# Patient Record
Sex: MALE | Race: WHITE | NOT HISPANIC OR LATINO | Employment: OTHER | URBAN - NONMETROPOLITAN AREA
[De-identification: names, ages, dates, MRNs, and addresses within clinical notes are randomized per-mention and may not be internally consistent; named-entity substitution may affect disease eponyms.]

---

## 2022-02-18 ENCOUNTER — HOSPITAL ENCOUNTER (OUTPATIENT)
Dept: ULTRASOUND IMAGING | Facility: HOSPITAL | Age: 67
Discharge: HOME | End: 2022-02-18
Admitting: NURSE PRACTITIONER

## 2022-02-18 PROCEDURE — 76857 US EXAM PELVIC LIMITED: CPT

## 2022-12-27 ENCOUNTER — OFFICE VISIT (OUTPATIENT)
Dept: INTERNAL MEDICINE | Facility: CLINIC | Age: 67
End: 2022-12-27

## 2022-12-27 VITALS
TEMPERATURE: 97.1 F | OXYGEN SATURATION: 98 % | HEART RATE: 87 BPM | HEIGHT: 64 IN | WEIGHT: 150 LBS | SYSTOLIC BLOOD PRESSURE: 120 MMHG | DIASTOLIC BLOOD PRESSURE: 74 MMHG | BODY MASS INDEX: 25.61 KG/M2

## 2022-12-27 DIAGNOSIS — G89.29 CHRONIC PAIN OF RIGHT KNEE: ICD-10-CM

## 2022-12-27 DIAGNOSIS — R93.3 ABNORMAL FINDINGS ON DIAGNOSTIC IMAGING OF OTHER PARTS OF DIGESTIVE TRACT: ICD-10-CM

## 2022-12-27 DIAGNOSIS — M54.16 RIGHT LUMBAR RADICULOPATHY: ICD-10-CM

## 2022-12-27 DIAGNOSIS — Z72.0 TOBACCO ABUSE: ICD-10-CM

## 2022-12-27 DIAGNOSIS — R53.83 FATIGUE, UNSPECIFIED TYPE: ICD-10-CM

## 2022-12-27 DIAGNOSIS — M25.561 CHRONIC PAIN OF RIGHT KNEE: ICD-10-CM

## 2022-12-27 DIAGNOSIS — Z12.5 SCREENING PSA (PROSTATE SPECIFIC ANTIGEN): ICD-10-CM

## 2022-12-27 DIAGNOSIS — L65.9 ALOPECIA: Primary | ICD-10-CM

## 2022-12-27 DIAGNOSIS — E55.9 VITAMIN D DEFICIENCY, UNSPECIFIED: ICD-10-CM

## 2022-12-27 DIAGNOSIS — R35.1 NOCTURIA: ICD-10-CM

## 2022-12-27 DIAGNOSIS — R25.2 LEG CRAMP: ICD-10-CM

## 2022-12-27 PROBLEM — J43.1 PANLOBULAR EMPHYSEMA: Status: ACTIVE | Noted: 2022-12-27

## 2022-12-27 PROCEDURE — 99204 OFFICE O/P NEW MOD 45 MIN: CPT | Performed by: INTERNAL MEDICINE

## 2022-12-27 RX ORDER — PRAMIPEXOLE DIHYDROCHLORIDE 0.25 MG/1
0.25 TABLET ORAL NIGHTLY
Qty: 30 TABLET | Refills: 2 | Status: SHIPPED | OUTPATIENT
Start: 2022-12-27 | End: 2023-02-07 | Stop reason: SDUPTHER

## 2022-12-27 RX ORDER — ALBUTEROL SULFATE 90 UG/1
AEROSOL, METERED RESPIRATORY (INHALATION) SEE ADMIN INSTRUCTIONS
COMMUNITY
Start: 2022-12-24 | End: 2023-02-07 | Stop reason: SDUPTHER

## 2022-12-27 RX ORDER — FLUTICASONE FUROATE AND VILANTEROL TRIFENATATE 100; 25 UG/1; UG/1
1 POWDER RESPIRATORY (INHALATION) DAILY
COMMUNITY
Start: 2022-12-24 | End: 2023-02-07 | Stop reason: SDUPTHER

## 2022-12-27 NOTE — PROGRESS NOTES
Subjective   Peter Ghotra is a 67 y.o. male.     Chief Complaint   Patient presents with   • Establish Care   • Hair/Scalp Problem     Month; right eyebrow (half); right mustache (half); patches on scalp        History of Present Illness   Patient here to establish care.  Patient complains 6 weeks hair loss patchy in the scalp mustache eyebrow area no itchy no rash denies any stress patient wonders about thyroid function.  Patient does have a fatigue comes and goes also complains of leg cramp at nighttime.  Patient does have a chronic pain in the right knee and the lower back.  Patient does get up at night to urinate.    Current Outpatient Medications:   •  Breo Ellipta 100-25 MCG/ACT aerosol powder , Inhale 1 puff Daily., Disp: , Rfl:   •  Ventolin  (90 Base) MCG/ACT inhaler, Inhale See Admin Instructions. Inhale 2 puffs by mouth every 4 to 6 hours as needed, Disp: , Rfl:   •  pramipexole (Mirapex) 0.25 MG tablet, Take 1 tablet by mouth Every Night., Disp: 30 tablet, Rfl: 2    The following portions of the patient's history were reviewed and updated as appropriate: allergies, current medications, past family history, past medical history, past social history, past surgical history and problem list.    Review of Systems   Constitutional: Positive for fatigue.   Respiratory: Positive for wheezing. Negative for shortness of breath.    Cardiovascular: Negative.  Negative for leg swelling.   Gastrointestinal: Negative.    Musculoskeletal: Positive for arthralgias and back pain.   Skin: Negative.    Neurological: Negative.    Psychiatric/Behavioral: Negative.  Negative for agitation and suicidal ideas. The patient is not nervous/anxious.        Objective   Physical Exam  Constitutional:       Appearance: Normal appearance. He is normal weight.   Cardiovascular:      Rate and Rhythm: Normal rate and regular rhythm.      Heart sounds: Normal heart sounds.   Pulmonary:      Effort: Pulmonary effort is normal.       Breath sounds: Wheezing present.   Abdominal:      General: Bowel sounds are normal.   Musculoskeletal:      Cervical back: Neck supple.   Skin:     General: Skin is warm.   Neurological:      Mental Status: He is alert and oriented to person, place, and time.   Psychiatric:         Behavior: Behavior normal.         All tests have been reviewed.    Assessment & Plan   Diagnoses and all orders for this visit:    Alopecia check lab  -     TSH  -     T4, Free    Tobacco abuse CT scan next encourage patient to quit    Fatigue, unspecified type  -     CBC & Differential  -     Comprehensive Metabolic Panel  -     Lipid Panel  -     Vitamin B12  -     C-reactive Protein  -     Sedimentation Rate  -     Hepatitis C Antibody  -     Folate  -     Vitamin D,25-Hydroxy    Leg cramp initiate medication  -     pramipexole (Mirapex) 0.25 MG tablet; Take 1 tablet by mouth Every Night.    Chronic pain of right knee to x-ray    Right lumbar radiculopathy  -     XR Spine Lumbar 2 or 3 View    Screening PSA (prostate specific antigen)  -     PSA DIAGNOSTIC    Vitamin D deficiency, unspecified  -     Vitamin D,25-Hydroxy    Abnormal findings on diagnostic imaging of other parts of digestive tract  -     Lipid Panel    Nocturia  -     PSA DIAGNOSTIC    Other orders  -     Ventolin  (90 Base) MCG/ACT inhaler; Inhale See Admin Instructions. Inhale 2 puffs by mouth every 4 to 6 hours as needed  -     Breo Ellipta 100-25 MCG/ACT aerosol powder ; Inhale 1 puff Daily.    4 week wellness

## 2023-02-07 ENCOUNTER — OFFICE VISIT (OUTPATIENT)
Dept: INTERNAL MEDICINE | Facility: CLINIC | Age: 68
End: 2023-02-07
Payer: MEDICARE

## 2023-02-07 VITALS
HEIGHT: 64 IN | OXYGEN SATURATION: 98 % | SYSTOLIC BLOOD PRESSURE: 124 MMHG | TEMPERATURE: 98.1 F | BODY MASS INDEX: 25.61 KG/M2 | WEIGHT: 150 LBS | HEART RATE: 70 BPM | DIASTOLIC BLOOD PRESSURE: 80 MMHG

## 2023-02-07 DIAGNOSIS — M54.16 RIGHT LUMBAR RADICULOPATHY: Primary | ICD-10-CM

## 2023-02-07 DIAGNOSIS — J43.1 PANLOBULAR EMPHYSEMA: ICD-10-CM

## 2023-02-07 DIAGNOSIS — Z12.5 SCREENING PSA (PROSTATE SPECIFIC ANTIGEN): ICD-10-CM

## 2023-02-07 DIAGNOSIS — F17.210 NICOTINE DEPENDENCE, CIGARETTES, UNCOMPLICATED: ICD-10-CM

## 2023-02-07 DIAGNOSIS — G89.29 CHRONIC PAIN OF RIGHT KNEE: ICD-10-CM

## 2023-02-07 DIAGNOSIS — R35.1 NOCTURIA: ICD-10-CM

## 2023-02-07 DIAGNOSIS — Z72.0 TOBACCO ABUSE: ICD-10-CM

## 2023-02-07 DIAGNOSIS — Z12.11 COLON CANCER SCREENING: ICD-10-CM

## 2023-02-07 DIAGNOSIS — E55.9 VITAMIN D DEFICIENCY, UNSPECIFIED: ICD-10-CM

## 2023-02-07 DIAGNOSIS — M25.561 CHRONIC PAIN OF RIGHT KNEE: ICD-10-CM

## 2023-02-07 DIAGNOSIS — R25.2 LEG CRAMP: ICD-10-CM

## 2023-02-07 PROBLEM — R53.83 FATIGUE: Status: RESOLVED | Noted: 2022-12-27 | Resolved: 2023-02-07

## 2023-02-07 PROBLEM — R93.3 ABNORMAL FINDINGS ON DIAGNOSTIC IMAGING OF OTHER PARTS OF DIGESTIVE TRACT: Status: RESOLVED | Noted: 2022-12-27 | Resolved: 2023-02-07

## 2023-02-07 PROBLEM — L65.9 ALOPECIA: Status: RESOLVED | Noted: 2022-12-27 | Resolved: 2023-02-07

## 2023-02-07 PROCEDURE — G0439 PPPS, SUBSEQ VISIT: HCPCS | Performed by: INTERNAL MEDICINE

## 2023-02-07 PROCEDURE — 99213 OFFICE O/P EST LOW 20 MIN: CPT | Performed by: INTERNAL MEDICINE

## 2023-02-07 PROCEDURE — 1170F FXNL STATUS ASSESSED: CPT | Performed by: INTERNAL MEDICINE

## 2023-02-07 PROCEDURE — 1125F AMNT PAIN NOTED PAIN PRSNT: CPT | Performed by: INTERNAL MEDICINE

## 2023-02-07 PROCEDURE — 99397 PER PM REEVAL EST PAT 65+ YR: CPT | Performed by: INTERNAL MEDICINE

## 2023-02-07 PROCEDURE — 1159F MED LIST DOCD IN RCRD: CPT | Performed by: INTERNAL MEDICINE

## 2023-02-07 RX ORDER — FLUTICASONE FUROATE AND VILANTEROL TRIFENATATE 100; 25 UG/1; UG/1
1 POWDER RESPIRATORY (INHALATION) DAILY
Qty: 28 EACH | Refills: 3 | Status: SHIPPED | OUTPATIENT
Start: 2023-02-07

## 2023-02-07 RX ORDER — NAPROXEN 500 MG/1
500 TABLET ORAL 2 TIMES DAILY WITH MEALS
Qty: 60 TABLET | Refills: 1 | Status: SHIPPED | OUTPATIENT
Start: 2023-02-07 | End: 2023-03-01

## 2023-02-07 RX ORDER — ALBUTEROL SULFATE 90 UG/1
1 POWDER, METERED RESPIRATORY (INHALATION) EVERY 6 HOURS PRN
Qty: 1 EACH | Refills: 3 | Status: SHIPPED | OUTPATIENT
Start: 2023-02-07

## 2023-02-07 RX ORDER — PRAMIPEXOLE DIHYDROCHLORIDE 0.25 MG/1
0.25 TABLET ORAL NIGHTLY
Qty: 90 TABLET | Refills: 3 | Status: SHIPPED | OUTPATIENT
Start: 2023-02-07

## 2023-02-07 NOTE — PROGRESS NOTES
Jarad Ghotra is a 67 y.o. male and is here for a comprehensive physical exam.     Do you take any herbs or supplements that were not prescribed by a doctor? no  Are you taking calcium supplements? no  Are you taking aspirin daily? no      The following portions of the patient's history were reviewed and updated as appropriate: allergies, current medications, past family history, past medical history, past social history, past surgical history and problem list.    Patient Active Problem List   Diagnosis   • Tobacco abuse   • Leg cramp   • Chronic pain of right knee   • Right lumbar radiculopathy   • Panlobular emphysema (HCC)   • Nocturia   • Vitamin D deficiency, unspecified   • Screening PSA (prostate specific antigen)       Review of Systems   Constitutional: Negative.    HENT: Negative.    Eyes: Negative.    Respiratory: Negative.    Cardiovascular: Negative.    Gastrointestinal: Negative.    Endocrine: Negative.    Genitourinary: Negative.    Musculoskeletal: Positive for back pain.   Skin: Negative.    Allergic/Immunologic: Negative.    Neurological: Positive for numbness.   Hematological: Negative.    Psychiatric/Behavioral: Negative.    All other systems reviewed and are negative.      Physical Exam  Vitals and nursing note reviewed.   Constitutional:       Appearance: Normal appearance. He is well-developed and normal weight.   HENT:      Head: Normocephalic and atraumatic.      Right Ear: Tympanic membrane, ear canal and external ear normal.      Left Ear: Tympanic membrane, ear canal and external ear normal.      Nose: Nose normal.      Mouth/Throat:      Mouth: Mucous membranes are moist.      Pharynx: Oropharynx is clear.   Eyes:      Conjunctiva/sclera: Conjunctivae normal.      Pupils: Pupils are equal, round, and reactive to light.   Neck:      Thyroid: No thyromegaly.   Cardiovascular:      Rate and Rhythm: Normal rate and regular rhythm.      Heart sounds: Normal heart sounds.    Pulmonary:      Effort: Pulmonary effort is normal.      Breath sounds: Normal breath sounds.   Abdominal:      General: Bowel sounds are normal.      Palpations: Abdomen is soft.   Genitourinary:     Penis: Normal.       Prostate: Normal.      Rectum: Normal.   Musculoskeletal:         General: Normal range of motion.      Cervical back: Normal range of motion and neck supple.   Skin:     General: Skin is warm and dry.   Neurological:      Mental Status: He is alert and oriented to person, place, and time.      Deep Tendon Reflexes: Reflexes are normal and symmetric.   Psychiatric:         Behavior: Behavior normal.         Thought Content: Thought content normal.         Judgment: Judgment normal.         All  tests have been reviewed.    Assessment & Plan          1. Patient Counseling:  --Nutrition: Stressed importance of moderation in sodium/caffeine intake, saturated fat and cholesterol, caloric balance, sufficient intake of fresh fruits, vegetables, fiber, calcium and iron.  --Exercise: Stressed the importance of regular exercise.   --Injury prevention: Discussed safety belts, safety helmets, smoke detector, smoking near bedding or upholstery.   --Dental health: Discussed importance of regular tooth brushing, flossing, and dental visits.  --Immunizations reviewed.  --Discussed benefits of screening colonoscopy.  --After hours service discussed with patient    2. Discussed the patient's BMI with him.

## 2023-02-07 NOTE — PROGRESS NOTES
The ABCs of the Annual Wellness Visit  Subsequent Medicare Wellness Visit    Jarad Ghotra is a 67 y.o. male who presents for a Subsequent Medicare Wellness Visit.    The following portions of the patient's history were reviewed and   updated as appropriate: allergies, current medications, past family history, past medical history, past social history, past surgical history and problem list.    Compared to one year ago, the patient feels his physical   health is the same.    Compared to one year ago, the patient feels his mental   health is the same.    Recent Hospitalizations:  He was not admitted to the hospital during the last year.       Current Medical Providers:  Patient Care Team:  Sukhjinder Duncan MD as PCP - General (Internal Medicine)    Outpatient Medications Prior to Visit   Medication Sig Dispense Refill   • Breo Ellipta 100-25 MCG/ACT aerosol powder  Inhale 1 puff Daily.     • pramipexole (Mirapex) 0.25 MG tablet Take 1 tablet by mouth Every Night. 30 tablet 2   • Ventolin  (90 Base) MCG/ACT inhaler Inhale See Admin Instructions. Inhale 2 puffs by mouth every 4 to 6 hours as needed       No facility-administered medications prior to visit.       No opioid medication identified on active medication list. I have reviewed chart for other potential  high risk medication/s and harmful drug interactions in the elderly.          Aspirin is not on active medication list.  Aspirin use is not indicated based on review of current medical condition/s. Risk of harm outweighs potential benefits.  .    Patient Active Problem List   Diagnosis   • Tobacco abuse   • Leg cramp   • Chronic pain of right knee   • Right lumbar radiculopathy   • Panlobular emphysema (HCC)   • Nocturia   • Vitamin D deficiency, unspecified   • Screening PSA (prostate specific antigen)     Advance Care Planning  Advance Directive is not on file.  ACP discussion was held with the patient during this visit. Patient does not  "have an advance directive, declines further assistance.     Objective    Vitals:    02/07/23 1035   BP: 124/80   Pulse: 70   Temp: 98.1 °F (36.7 °C)   SpO2: 98%   Weight: 68 kg (150 lb)   Height: 162.6 cm (64.02\")   PainSc:   7   PainLoc: Back     Estimated body mass index is 25.73 kg/m² as calculated from the following:    Height as of this encounter: 162.6 cm (64.02\").    Weight as of this encounter: 68 kg (150 lb).    BMI is >= 25 and <30. (Overweight) The following options were offered after discussion;: exercise counseling/recommendations and nutrition counseling/recommendations      Does the patient have evidence of cognitive impairment?   No            HEALTH RISK ASSESSMENT    Smoking Status:  Social History     Tobacco Use   Smoking Status Every Day   • Packs/day: 0.50   • Types: Cigarettes   Smokeless Tobacco Never     Alcohol Consumption:  Social History     Substance and Sexual Activity   Alcohol Use Yes   • Alcohol/week: 2.0 standard drinks   • Types: 2 Cans of beer per week     Fall Risk Screen:    STEADI Fall Risk Assessment was completed, and patient is at LOW risk for falls.Assessment completed on:2/7/2023    Depression Screening:  PHQ-2/PHQ-9 Depression Screening 2/7/2023   Little Interest or Pleasure in Doing Things 0-->not at all   Feeling Down, Depressed or Hopeless 1-->several days   PHQ-9: Brief Depression Severity Measure Score 1       Health Habits and Functional and Cognitive Screening:  Functional & Cognitive Status 2/7/2023   Do you have difficulty preparing food and eating? No   Do you have difficulty bathing yourself, getting dressed or grooming yourself? No   Do you have difficulty using the toilet? No   Do you have difficulty moving around from place to place? Yes   Do you have trouble with steps or getting out of a bed or a chair? Yes   Current Diet Well Balanced Diet   Dental Exam Unknown        Dental Exam Comment Wears dentures   Eye Exam Up to date        Eye Exam Comment 2022 "   Exercise (times per week) 0 times per week   Current Exercises Include No Regular Exercise   Do you need help using the phone?  No   Are you deaf or do you have serious difficulty hearing?  No   Do you need help with transportation? No   Do you need help shopping? No   Do you need help preparing meals?  No   Do you need help with housework?  No   Do you need help with laundry? No   Do you need help taking your medications? No   Do you need help managing money? No   Do you ever drive or ride in a car without wearing a seat belt? No   Have you felt unusual stress, anger or loneliness in the last month? Yes   Who do you live with? Spouse   If you need help, do you have trouble finding someone available to you? No   Have you been bothered in the last four weeks by sexual problems? No   Do you have difficulty concentrating, remembering or making decisions? Yes       Age-appropriate Screening Schedule:  Refer to the list below for future screening recommendations based on patient's age, sex and/or medical conditions. Orders for these recommended tests are listed in the plan section. The patient has been provided with a written plan.    Health Maintenance   Topic Date Due   • DIABETIC EYE EXAM  12/27/2022   • DIABETIC FOOT EXAM  02/22/2023 (Originally 12/27/2022)   • URINE MICROALBUMIN  02/25/2023 (Originally 1955)   • HEMOGLOBIN A1C  03/01/2023 (Originally 12/27/2022)   • ZOSTER VACCINE (1 of 2) 03/05/2025 (Originally 5/14/2005)   • TDAP/TD VACCINES (2 - Td or Tdap) 11/23/2027   • INFLUENZA VACCINE  Completed                CMS Preventative Services Quick Reference  Risk Factors Identified During Encounter:    None Identified    The above risks/problems have been discussed with the patient.  Pertinent information has been shared with the patient in the After Visit Summary.    Diagnoses and all orders for this visit:    1. Right lumbar radiculopathy (Primary)  -     naproxen (Naprosyn) 500 MG tablet; Take 1 tablet  by mouth 2 (Two) Times a Day With Meals.  Dispense: 60 tablet; Refill: 1  -     Ambulatory Referral to Pain Management    2. Leg cramp  -     pramipexole (Mirapex) 0.25 MG tablet; Take 1 tablet by mouth Every Night.  Dispense: 90 tablet; Refill: 3    3. Vitamin D deficiency, unspecified    4. Screening PSA (prostate specific antigen)    5. Nocturia    6. Chronic pain of right knee    7. Panlobular emphysema (HCC)  -     Breo Ellipta 100-25 MCG/ACT aerosol powder ; Inhale 1 puff Daily.  Dispense: 28 each; Refill: 3  -     albuterol (ProAir RespiClick) 108 (90 Base) MCG/ACT inhaler; Inhale 1 puff Every 6 (Six) Hours As Needed for Shortness of Air.  Dispense: 1 each; Refill: 3    8. Tobacco abuse  -      CT Chest Low Dose Cancer Screening WO    9. Colon cancer screening  -     Ambulatory Referral to Gastroenterology    10. Nicotine dependence, cigarettes, uncomplicated  -      CT Chest Low Dose Cancer Screening WO        Follow Up:   Next Medicare Wellness visit to be scheduled in 1 year.      An After Visit Summary and PPPS were made available to the patient.      Patient has been erroneously marked as diabetic. Based on the available clinical information, he does not have diabetes and should therefore be excluded from diabetic health maintenance and quality measures for the remainder of the reporting period.

## 2023-02-07 NOTE — PROGRESS NOTES
Subjective   Peter Ghotra is a 67 y.o. male.     Chief Complaint   Patient presents with   • Medicare Wellness-subsequent       History of Present Illness   Patient here for Medicare wellness also has medical problems to be discussed.  Patient complains of lower back pain gradually getting worse history of radiculopathy pain radiated to right lower extremity all the way to below the knee numbness tingling certain position worse achy in nature.    Current Outpatient Medications:   •  Breo Ellipta 100-25 MCG/ACT aerosol powder , Inhale 1 puff Daily., Disp: 28 each, Rfl: 3  •  pramipexole (Mirapex) 0.25 MG tablet, Take 1 tablet by mouth Every Night., Disp: 90 tablet, Rfl: 3  •  albuterol (ProAir RespiClick) 108 (90 Base) MCG/ACT inhaler, Inhale 1 puff Every 6 (Six) Hours As Needed for Shortness of Air., Disp: 1 each, Rfl: 3  •  naproxen (Naprosyn) 500 MG tablet, Take 1 tablet by mouth 2 (Two) Times a Day With Meals., Disp: 60 tablet, Rfl: 1    The following portions of the patient's history were reviewed and updated as appropriate: allergies, current medications, past family history, past medical history, past social history, past surgical history and problem list.    Review of Systems   Musculoskeletal: Positive for back pain.   Neurological: Positive for numbness.       Objective   Physical Exam  Musculoskeletal:         General: Tenderness present.         All tests have been reviewed.    Assessment & Plan   Diagnoses and all orders for this visit:    Right lumbar radiculopathy  -     naproxen (Naprosyn) 500 MG tablet; Take 1 tablet by mouth 2 (Two) Times a Day With Meals.  -     Ambulatory Referral to Pain Management  Lumbar x-ray    -     albuterol (ProAir RespiClick) 108 (90 Base) MCG/ACT inhaler; Inhale 1 puff Every 6 (Six) Hours As Needed for Shortness of Air.

## 2023-02-08 ENCOUNTER — HOSPITAL ENCOUNTER (OUTPATIENT)
Dept: GENERAL RADIOLOGY | Facility: HOSPITAL | Age: 68
Discharge: HOME | End: 2023-02-08
Admitting: INTERNAL MEDICINE
Payer: MEDICARE

## 2023-02-08 DIAGNOSIS — M25.561 CHRONIC PAIN OF RIGHT KNEE: ICD-10-CM

## 2023-02-08 DIAGNOSIS — G89.29 CHRONIC PAIN OF RIGHT KNEE: ICD-10-CM

## 2023-02-08 PROCEDURE — 72100 X-RAY EXAM L-S SPINE 2/3 VWS: CPT

## 2023-02-08 PROCEDURE — 73562 X-RAY EXAM OF KNEE 3: CPT

## 2023-02-09 LAB
25(OH)D3+25(OH)D2 SERPL-MCNC: 13.6 NG/ML (ref 30–100)
ALBUMIN SERPL-MCNC: 4.4 G/DL (ref 3.5–5.2)
ALBUMIN/GLOB SERPL: 2.4 G/DL
ALP SERPL-CCNC: 64 U/L (ref 39–117)
ALT SERPL-CCNC: 16 U/L (ref 1–41)
AST SERPL-CCNC: 14 U/L (ref 1–40)
BASOPHILS # BLD AUTO: 0.07 10*3/MM3 (ref 0–0.2)
BASOPHILS NFR BLD AUTO: 0.9 % (ref 0–1.5)
BILIRUB SERPL-MCNC: 0.9 MG/DL (ref 0–1.2)
BUN SERPL-MCNC: 18 MG/DL (ref 8–23)
BUN/CREAT SERPL: 28.1 (ref 7–25)
CALCIUM SERPL-MCNC: 9.3 MG/DL (ref 8.6–10.5)
CHLORIDE SERPL-SCNC: 103 MMOL/L (ref 98–107)
CHOLEST SERPL-MCNC: 168 MG/DL (ref 0–200)
CO2 SERPL-SCNC: 28.7 MMOL/L (ref 22–29)
CREAT SERPL-MCNC: 0.64 MG/DL (ref 0.76–1.27)
CRP SERPL-MCNC: <0.3 MG/DL (ref 0–0.5)
EGFRCR SERPLBLD CKD-EPI 2021: 103.8 ML/MIN/1.73
EOSINOPHIL # BLD AUTO: 0.16 10*3/MM3 (ref 0–0.4)
EOSINOPHIL NFR BLD AUTO: 1.9 % (ref 0.3–6.2)
ERYTHROCYTE [DISTWIDTH] IN BLOOD BY AUTOMATED COUNT: 11.7 % (ref 12.3–15.4)
ERYTHROCYTE [SEDIMENTATION RATE] IN BLOOD BY WESTERGREN METHOD: <1 MM/HR (ref 0–20)
FOLATE SERPL-MCNC: 5.36 NG/ML (ref 4.78–24.2)
GLOBULIN SER CALC-MCNC: 1.8 GM/DL
GLUCOSE SERPL-MCNC: 142 MG/DL (ref 65–99)
HCT VFR BLD AUTO: 47.2 % (ref 37.5–51)
HCV AB S/CO SERPL IA: >11 S/CO RATIO (ref 0–0.9)
HDLC SERPL-MCNC: 57 MG/DL (ref 40–60)
HGB BLD-MCNC: 15.9 G/DL (ref 13–17.7)
IMM GRANULOCYTES # BLD AUTO: 0.02 10*3/MM3 (ref 0–0.05)
IMM GRANULOCYTES NFR BLD AUTO: 0.2 % (ref 0–0.5)
LDLC SERPL CALC-MCNC: 94 MG/DL (ref 0–100)
LYMPHOCYTES # BLD AUTO: 2.12 10*3/MM3 (ref 0.7–3.1)
LYMPHOCYTES NFR BLD AUTO: 25.8 % (ref 19.6–45.3)
MCH RBC QN AUTO: 32.6 PG (ref 26.6–33)
MCHC RBC AUTO-ENTMCNC: 33.7 G/DL (ref 31.5–35.7)
MCV RBC AUTO: 96.9 FL (ref 79–97)
MONOCYTES # BLD AUTO: 0.79 10*3/MM3 (ref 0.1–0.9)
MONOCYTES NFR BLD AUTO: 9.6 % (ref 5–12)
NEUTROPHILS # BLD AUTO: 5.05 10*3/MM3 (ref 1.7–7)
NEUTROPHILS NFR BLD AUTO: 61.6 % (ref 42.7–76)
NRBC BLD AUTO-RTO: 0 /100 WBC (ref 0–0.2)
PLATELET # BLD AUTO: 188 10*3/MM3 (ref 140–450)
POTASSIUM SERPL-SCNC: 4.9 MMOL/L (ref 3.5–5.2)
PROT SERPL-MCNC: 6.2 G/DL (ref 6–8.5)
PSA SERPL-MCNC: 1.05 NG/ML (ref 0–4)
RBC # BLD AUTO: 4.87 10*6/MM3 (ref 4.14–5.8)
SODIUM SERPL-SCNC: 140 MMOL/L (ref 136–145)
T4 FREE SERPL-MCNC: 1.33 NG/DL (ref 0.93–1.7)
TRIGL SERPL-MCNC: 90 MG/DL (ref 0–150)
TSH SERPL DL<=0.005 MIU/L-ACNC: 0.56 UIU/ML (ref 0.27–4.2)
VIT B12 SERPL-MCNC: 250 PG/ML (ref 211–946)
VLDLC SERPL CALC-MCNC: 17 MG/DL (ref 5–40)
WBC # BLD AUTO: 8.21 10*3/MM3 (ref 3.4–10.8)

## 2023-02-14 ENCOUNTER — HOSPITAL ENCOUNTER (OUTPATIENT)
Dept: CT IMAGING | Facility: HOSPITAL | Age: 68
Discharge: HOME | End: 2023-02-14
Admitting: INTERNAL MEDICINE
Payer: MEDICARE

## 2023-02-14 PROCEDURE — 71271 CT THORAX LUNG CANCER SCR C-: CPT

## 2023-03-01 ENCOUNTER — OFFICE VISIT (OUTPATIENT)
Dept: INTERNAL MEDICINE | Facility: CLINIC | Age: 68
End: 2023-03-01
Payer: MEDICARE

## 2023-03-01 VITALS
BODY MASS INDEX: 25.61 KG/M2 | OXYGEN SATURATION: 98 % | HEIGHT: 64 IN | DIASTOLIC BLOOD PRESSURE: 80 MMHG | SYSTOLIC BLOOD PRESSURE: 120 MMHG | HEART RATE: 66 BPM | WEIGHT: 150 LBS | TEMPERATURE: 97.8 F

## 2023-03-01 DIAGNOSIS — J43.1 PANLOBULAR EMPHYSEMA: ICD-10-CM

## 2023-03-01 DIAGNOSIS — Z72.0 TOBACCO ABUSE: ICD-10-CM

## 2023-03-01 DIAGNOSIS — R25.2 LEG CRAMP: ICD-10-CM

## 2023-03-01 DIAGNOSIS — M54.16 RIGHT LUMBAR RADICULOPATHY: Primary | ICD-10-CM

## 2023-03-01 DIAGNOSIS — B18.2 CHRONIC HEPATITIS C WITHOUT HEPATIC COMA: ICD-10-CM

## 2023-03-01 DIAGNOSIS — E55.9 VITAMIN D DEFICIENCY, UNSPECIFIED: ICD-10-CM

## 2023-03-01 DIAGNOSIS — R91.1 LUNG NODULE: ICD-10-CM

## 2023-03-01 DIAGNOSIS — R73.9 HYPERGLYCEMIA: ICD-10-CM

## 2023-03-01 PROCEDURE — 99214 OFFICE O/P EST MOD 30 MIN: CPT | Performed by: INTERNAL MEDICINE

## 2023-03-01 RX ORDER — ERGOCALCIFEROL 1.25 MG/1
50000 CAPSULE ORAL WEEKLY
Qty: 5 CAPSULE | Refills: 2 | Status: SHIPPED | OUTPATIENT
Start: 2023-03-01

## 2023-03-01 NOTE — PROGRESS NOTES
Subjective   Peter Ghotra is a 67 y.o. male.     Chief Complaint   Patient presents with   • Follow-up     Recent lab results    • Arthritis     Discuss medication- unable to tolerate Naproxen long term        History of Present Illness   Patient here for follow-up.  Patient still complains of lower back pain radiated to right leg over-the-counter medicine not helpful patient had a history of epidural helped.  Pain clinic patient here to see.  Blood test that showed patient has a very low vitamin D and also positive for hepatitis C.  Leg cramping improved after medication.  Low-dose CT scan showed the lung nodules.  Emphysema stable on medication.  Patient has already cut down  tobacco use.    Current Outpatient Medications:   •  albuterol (ProAir RespiClick) 108 (90 Base) MCG/ACT inhaler, Inhale 1 puff Every 6 (Six) Hours As Needed for Shortness of Air., Disp: 1 each, Rfl: 3  •  Breo Ellipta 100-25 MCG/ACT aerosol powder , Inhale 1 puff Daily., Disp: 28 each, Rfl: 3  •  pramipexole (Mirapex) 0.25 MG tablet, Take 1 tablet by mouth Every Night., Disp: 90 tablet, Rfl: 3  •  vitamin D (ERGOCALCIFEROL) 1.25 MG (43301 UT) capsule capsule, Take 1 capsule by mouth 1 (One) Time Per Week., Disp: 5 capsule, Rfl: 2    The following portions of the patient's history were reviewed and updated as appropriate: allergies, current medications, past family history, past medical history, past social history, past surgical history and problem list.    Review of Systems   Constitutional: Negative.    Respiratory: Negative.    Cardiovascular: Negative.    Gastrointestinal: Negative.    Musculoskeletal: Positive for back pain.   Skin: Negative.    Neurological: Negative.    Psychiatric/Behavioral: Negative.        Objective   Physical Exam  Cardiovascular:      Rate and Rhythm: Normal rate and regular rhythm.      Heart sounds: Normal heart sounds.   Pulmonary:      Effort: Pulmonary effort is normal.      Breath sounds: Normal  breath sounds.   Abdominal:      General: Bowel sounds are normal.   Musculoskeletal:         General: Tenderness present.      Cervical back: Neck supple.   Skin:     General: Skin is warm.   Neurological:      Mental Status: He is alert and oriented to person, place, and time.   Psychiatric:         Behavior: Behavior normal.         All tests have been reviewed.    Assessment & Plan   Diagnoses and all orders for this visit:    Right lumbar radiculopathy Tylenol arthritis referred for MRI patient is going to call pain clinic today  -     MRI Lumbar Spine Without Contrast; Future    Vitamin D deficiency, unspecified initiate medication  -     vitamin D (ERGOCALCIFEROL) 1.25 MG (16534 UT) capsule capsule; Take 1 capsule by mouth 1 (One) Time Per Week.    Hyperglycemia good diet to lower the sugar check again next time    Chronic hepatitis C without hepatic coma (HCC) refer to GI patient has appointment on May 9    Tobacco abuse continue to cut down tobacco to quit    Lung nodule repeat in 12 months    Leg cramp improved after Mirapex    Panlobular emphysema (HCC)    Follow-up in 1 month

## 2023-03-13 ENCOUNTER — HOSPITAL ENCOUNTER (OUTPATIENT)
Facility: HOSPITAL | Age: 68
Setting detail: HOSPITAL OUTPATIENT SURGERY
Discharge: HOME | End: 2023-03-13
Attending: OPHTHALMOLOGY | Admitting: OPHTHALMOLOGY
Payer: MEDICARE

## 2023-03-13 ENCOUNTER — ANESTHESIA (OUTPATIENT)
Dept: PERIOP | Facility: HOSPITAL | Age: 68
End: 2023-03-13
Payer: MEDICARE

## 2023-03-13 ENCOUNTER — ANESTHESIA EVENT (OUTPATIENT)
Dept: PERIOP | Facility: HOSPITAL | Age: 68
End: 2023-03-13
Payer: MEDICARE

## 2023-03-13 VITALS
SYSTOLIC BLOOD PRESSURE: 158 MMHG | TEMPERATURE: 98 F | RESPIRATION RATE: 18 BRPM | HEART RATE: 66 BPM | OXYGEN SATURATION: 97 % | DIASTOLIC BLOOD PRESSURE: 75 MMHG

## 2023-03-13 LAB — GLUCOSE BLDC GLUCOMTR-MCNC: 168 MG/DL (ref 70–130)

## 2023-03-13 PROCEDURE — 82962 GLUCOSE BLOOD TEST: CPT

## 2023-03-13 PROCEDURE — V2632 POST CHMBR INTRAOCULAR LENS: HCPCS | Performed by: OPHTHALMOLOGY

## 2023-03-13 PROCEDURE — 25010000002 EPINEPHRINE PER 0.1 MG: Performed by: OPHTHALMOLOGY

## 2023-03-13 PROCEDURE — 25010000002 MIDAZOLAM PER 1MG: Performed by: NURSE ANESTHETIST, CERTIFIED REGISTERED

## 2023-03-13 DEVICE — LENS MONOFOCAL IQ CC60WF225: Type: IMPLANTABLE DEVICE | Site: POSTERIOR CHAMBER | Status: FUNCTIONAL

## 2023-03-13 RX ORDER — SODIUM CHLORIDE, SODIUM LACTATE, POTASSIUM CHLORIDE, CALCIUM CHLORIDE 600; 310; 30; 20 MG/100ML; MG/100ML; MG/100ML; MG/100ML
1000 INJECTION, SOLUTION INTRAVENOUS CONTINUOUS
Status: DISCONTINUED | OUTPATIENT
Start: 2023-03-13 | End: 2023-03-13 | Stop reason: HOSPADM

## 2023-03-13 RX ORDER — PREDNISOLONE ACETATE 10 MG/ML
1 SUSPENSION/ DROPS OPHTHALMIC SEE ADMIN INSTRUCTIONS
Status: DISCONTINUED | OUTPATIENT
Start: 2023-03-13 | End: 2023-03-13 | Stop reason: HOSPADM

## 2023-03-13 RX ORDER — MIDAZOLAM HYDROCHLORIDE 2 MG/2ML
INJECTION, SOLUTION INTRAMUSCULAR; INTRAVENOUS AS NEEDED
Status: DISCONTINUED | OUTPATIENT
Start: 2023-03-13 | End: 2023-03-13 | Stop reason: SURG

## 2023-03-13 RX ORDER — POLYMYXIN B SULFATE AND TRIMETHOPRIM 1; 10000 MG/ML; [USP'U]/ML
1 SOLUTION OPHTHALMIC SEE ADMIN INSTRUCTIONS
Status: DISCONTINUED | OUTPATIENT
Start: 2023-03-13 | End: 2023-03-13 | Stop reason: HOSPADM

## 2023-03-13 RX ORDER — TETRACAINE HYDROCHLORIDE 5 MG/ML
SOLUTION OPHTHALMIC AS NEEDED
Status: DISCONTINUED | OUTPATIENT
Start: 2023-03-13 | End: 2023-03-13 | Stop reason: HOSPADM

## 2023-03-13 RX ORDER — POLYMYXIN B SULFATE AND TRIMETHOPRIM 1; 10000 MG/ML; [USP'U]/ML
SOLUTION OPHTHALMIC AS NEEDED
Status: DISCONTINUED | OUTPATIENT
Start: 2023-03-13 | End: 2023-03-13 | Stop reason: HOSPADM

## 2023-03-13 RX ORDER — TETRACAINE HYDROCHLORIDE 5 MG/ML
1 SOLUTION OPHTHALMIC SEE ADMIN INSTRUCTIONS
Status: DISCONTINUED | OUTPATIENT
Start: 2023-03-13 | End: 2023-03-13 | Stop reason: HOSPADM

## 2023-03-13 RX ORDER — BALANCED SALT SOLUTION 6.4; .75; .48; .3; 3.9; 1.7 MG/ML; MG/ML; MG/ML; MG/ML; MG/ML; MG/ML
SOLUTION OPHTHALMIC AS NEEDED
Status: DISCONTINUED | OUTPATIENT
Start: 2023-03-13 | End: 2023-03-13 | Stop reason: HOSPADM

## 2023-03-13 RX ORDER — KETAMINE HYDROCHLORIDE 50 MG/ML
INJECTION, SOLUTION, CONCENTRATE INTRAMUSCULAR; INTRAVENOUS AS NEEDED
Status: DISCONTINUED | OUTPATIENT
Start: 2023-03-13 | End: 2023-03-13 | Stop reason: SURG

## 2023-03-13 RX ORDER — CYCLOPENT/TROPIC/PHEN/KETR/WAT 1%-1%-2.5%
1 DROPS (EA) OPHTHALMIC (EYE)
Status: COMPLETED | OUTPATIENT
Start: 2023-03-13 | End: 2023-03-13

## 2023-03-13 RX ORDER — PREDNISOLONE ACETATE 10 MG/ML
SUSPENSION/ DROPS OPHTHALMIC AS NEEDED
Status: DISCONTINUED | OUTPATIENT
Start: 2023-03-13 | End: 2023-03-13 | Stop reason: HOSPADM

## 2023-03-13 RX ADMIN — Medication 1 DROP: at 06:35

## 2023-03-13 RX ADMIN — MIDAZOLAM HYDROCHLORIDE 1.5 MG: 1 INJECTION, SOLUTION INTRAMUSCULAR; INTRAVENOUS at 07:52

## 2023-03-13 RX ADMIN — TETRACAINE HYDROCHLORIDE 1 DROP: 5 SOLUTION OPHTHALMIC at 06:29

## 2023-03-13 RX ADMIN — Medication 1 DROP: at 06:30

## 2023-03-13 RX ADMIN — TETRACAINE HYDROCHLORIDE 1 DROP: 5 SOLUTION OPHTHALMIC at 06:28

## 2023-03-13 RX ADMIN — Medication 1 DROP: at 06:40

## 2023-03-13 RX ADMIN — SODIUM CHLORIDE, POTASSIUM CHLORIDE, SODIUM LACTATE AND CALCIUM CHLORIDE 1000 ML: 600; 310; 30; 20 INJECTION, SOLUTION INTRAVENOUS at 06:35

## 2023-03-13 RX ADMIN — KETAMINE HYDROCHLORIDE 6 MG: 50 INJECTION, SOLUTION INTRAMUSCULAR; INTRAVENOUS at 07:52

## 2023-03-13 RX ADMIN — MIDAZOLAM HYDROCHLORIDE 0.5 MG: 1 INJECTION, SOLUTION INTRAMUSCULAR; INTRAVENOUS at 07:56

## 2023-03-13 NOTE — DISCHARGE INSTRUCTIONS
[facility] [address] [place] [address]  PH: [phone]  FAX: [phone]      Post - Operative Instructions for Dr. Ennis's Cataract Patients      1.  Use your drops as prescribed, wait 5 minutes between each drop.  Eye drops are to be used as instructed during the daytime only. Do not get up in the night to use eye drops.  2.  Securely tape the protective shield over the operated eye at bedtime for the FIRST week only.  3.  Take your regular non-eye medications and continue any eye medications for the  non-operative eye.  4.  For the first week, avoid bending or stooping and lifting anything heavier than 5 pounds.  Also, avoid any blow to the head or eye.  Avoid getting dirty water in the eye.  Avoid sleeping on the side of the operated eye or face.  5.  No driving until you are told to by your physician.  6.  If significant eye pain is not relieved by medication, or you have increased redness, swelling, pain or loss of vision or any symptoms you are unsure of call Dr. Ennis's office at [phone].      You should expect:    Slight Discomfort  Burning When Using Eye Drops  Blurred Vision and Dilated Pupil  Mild Redness  You Will Not Be Able To Read  Your Glasses May Not Work      PHYSICIAN TO CHOOSE THE APPROPRIATE MEDICATIONS TO BE SENT HOME WITH PATIENT    __x__  Prednisolone Acetate 1% (Pred Forte) ophthalmic solution    ____   Difluprednate (Durezol) .05% ophthalmic emulsion    ____   Vigamox(moxifloxacin) 0.5% ophthalmic solution    __x__   Polymyxin B/Trimethoprim solution    ____   Bacitracin Zinc and polymyxin B sulfate ophthalmic ointment    Start the following eyedrops today as soon as you are home:     Prednisolone Acetate 1% (Pred Forte) ophthalmic solution instill one drop every 4 hours  Polytrim instill one drop every 4 hours    Wait 5 mins between drops. Eye drops are to be used as instructed during the daytime only. Do not get up in the  night to use eye drops.    You may only remove the eye shield to administer eye medications (place shield back over eye after each medication administration); otherwise keep shield on at all times until office visit tomorrow.       Please follow all post op instructions and follow up appointment time from your physician's office included in your discharge packet.  .  No pushing, pulling, tugging,  heavy lifting, or strenuous activity.  No major decision making, driving, or drinking alcoholic beverages for 24 hours. ( due to the medications you have  received)  Always use good hand hygiene/washing techniques.  NO driving while taking pain medications.    * if you have an incision:  Check your incision area every day for signs of infection.   Check for:  * more redness, swelling, or pain  *more fluid or blood  *warmth  *pus or bad smell     To assist you in voiding:  Drink plenty of fluids  Listen to running water while attempting to void.    If you are unable to urinate and you have an uncomfortable urge to void or it has been   6 hours since you were discharged, return to the Emergency Room

## 2023-03-13 NOTE — OP NOTE
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    SURGEON:  Alesha Ennis M.D.    NAME OF PROCEDURE:  Right cataract extraction with posterior chamber intraocular lens implant. Lens used: +   Implant Name Type Inv. Item Serial No.  Lot No. LRB No. Used Action   LENS MONOFOCAL IQ [id number] - [id number] - [id number] Implant LENS MONOFOCAL IQ [id number] MINNIE  Right 1 Implanted     CDE: 7.55    ANESTHESIA:  Topical with MAC.    COMPLICATIONS:  None.    ESTIMATED BLOOD LOSS:  Less than 1cc.    DETAILS OF THE PROCEDURE:  After receiving appropriate informed consent and confirming and marking the eye to be operated upon, the patient was wheeled into the operating room. After confirming adequate anesthesia, the patient was prepped and draped in a standard sterile ophthalmic fashion. A lid speculum was then placed in the eye.  A 0.8 mm metal blade was then used to make two limbal paracenteses and the anterior chamber was reformed with Viscoat.  A 2.4 mm metal keratome was then used to make a temporal clear cornea tunneled incision.  A cystotome was used to puncture the anterior capsule and initiate a capsular flap.  Utrata forceps were used to complete a continuous curvilinear capsulorrhexis.  BSS on a Nice hydrodissection cannula was then used to hydrodissect and hydrodelineate the nucleus.  The nucleus was then phacoemulsified using a stop and chop technique.   CDE was 7.55%.  Remaining cortex was removed with bimanual I/A.  Posterior capsular polish was performed.  The capsular bag was then reformed with Provisc and a   Implant Name Type Inv. Item Serial No.  Lot No. LRB No. Used Action   LENS MONOFOCAL IQ [id number] - [id number] - [id number] Implant LENS MONOFOCAL IQ [id number] MINNIE  Right 1 Implanted     lens implant, was then placed in the bag without event.  The Provisc was then removed with bimanual irrigation and aspiration and the anterior chamber reformed with BSS.  The wounds were hydrated as necessary to maintain a water tight anterior chamber. Intracameral Moxifloxacin 1mg/0.1ml was administered and 5% povidone iodine solution was placed on the ocular surface. At the conclusion of the procedure, the lid speculum was removed and the patient undraped.  A drop of Polytrim and prednisolone acetate 1%  and shield were placed over the eye.  The patient left the room in good condition having tolerated the procedure well.    Alesha Ennis MD

## 2023-03-13 NOTE — ANESTHESIA POSTPROCEDURE EVALUATION
Patient: Peter Ghotra    Procedure Summary     Date: 03/13/23 Room / Location: [facility] OR 1 / [facility] OR    Anesthesia Start: 0748 Anesthesia Stop: 0813    Procedure: CATARACT PHACO EXTRACTION WITH INTRAOCULAR LENS IMPLANT RIGHT (Right: Eye) Diagnosis:       Combined forms of age-related cataract of right eye      (Combined forms of age-related cataract of right eye [H25.811])    Surgeons: Alesha Ennis MD Provider: Bahman Zee CRNA    Anesthesia Type: MAC ASA Status: 2          Anesthesia Type: MAC    Vitals  No vitals data found for the desired time range.          Post Anesthesia Care and Evaluation    Patient location during evaluation: PHASE II  Patient participation: complete - patient participated  Level of consciousness: awake and alert  Pain score: 1  Pain management: satisfactory to patient    Airway patency: patent  Anesthetic complications: No anesthetic complications  PONV Status: none  Cardiovascular status: acceptable and stable  Respiratory status: acceptable, nonlabored ventilation, spontaneous ventilation and unassisted  Hydration status: acceptable    Comments: Vitals signs as noted in nursing documentation as per protocol.

## 2023-03-13 NOTE — ANESTHESIA PREPROCEDURE EVALUATION
Anesthesia Evaluation     Patient summary reviewed and Nursing notes reviewed   NPO Solid Status: > 8 hours  NPO Liquid Status: > 8 hours           Airway   Mallampati: I  TM distance: >3 FB  Neck ROM: full  No difficulty expected  Dental      Pulmonary    (+) a smoker Current, COPD, decreased breath sounds,   Cardiovascular - negative cardio ROS    Rhythm: regular  Rate: normal        Neuro/Psych- negative ROS  GI/Hepatic/Renal/Endo    (+)   hepatitis C, diabetes mellitus,     Musculoskeletal (-) negative ROS    Abdominal    Substance History - negative use     OB/GYN          Other - negative ROS                       Anesthesia Plan    ASA 2     MAC     intravenous induction     Anesthetic plan, risks, benefits, and alternatives have been provided, discussed and informed consent has been obtained with: patient.  Pre-procedure education provided      CODE STATUS:

## 2023-04-12 ENCOUNTER — OFFICE VISIT (OUTPATIENT)
Dept: INTERNAL MEDICINE | Facility: CLINIC | Age: 68
End: 2023-04-12
Payer: MEDICARE

## 2023-04-12 VITALS
BODY MASS INDEX: 25.27 KG/M2 | OXYGEN SATURATION: 97 % | WEIGHT: 148 LBS | HEART RATE: 70 BPM | DIASTOLIC BLOOD PRESSURE: 82 MMHG | HEIGHT: 64 IN | SYSTOLIC BLOOD PRESSURE: 136 MMHG | TEMPERATURE: 97.3 F

## 2023-04-12 DIAGNOSIS — B18.2 CHRONIC HEPATITIS C WITHOUT HEPATIC COMA: ICD-10-CM

## 2023-04-12 DIAGNOSIS — E55.9 VITAMIN D DEFICIENCY, UNSPECIFIED: Primary | ICD-10-CM

## 2023-04-12 DIAGNOSIS — R25.2 LEG CRAMP: ICD-10-CM

## 2023-04-12 DIAGNOSIS — L65.9 ALOPECIA: ICD-10-CM

## 2023-04-12 DIAGNOSIS — Z72.0 TOBACCO ABUSE: ICD-10-CM

## 2023-04-12 DIAGNOSIS — R73.9 HYPERGLYCEMIA: ICD-10-CM

## 2023-04-12 DIAGNOSIS — M54.16 RIGHT LUMBAR RADICULOPATHY: ICD-10-CM

## 2023-04-12 LAB
EXPIRATION DATE: NORMAL
HBA1C MFR BLD: 7.4 %
Lab: NORMAL

## 2023-04-12 RX ORDER — GABAPENTIN 300 MG/1
300 CAPSULE ORAL 2 TIMES DAILY
Qty: 60 CAPSULE | Refills: 1 | Status: SHIPPED | OUTPATIENT
Start: 2023-04-12

## 2023-04-12 NOTE — PROGRESS NOTES
Subjective   Peter Ghotra is a 67 y.o. male.     Chief Complaint   Patient presents with   • Follow-up     Recent lab results   • Back Pain     Symptoms persistent   • Vitamin D Deficiency       History of Present Illness  Pt presents for 1-month followup for hyperglycemia, Vit D deficiency, back pain. He is scheduled to see pain management in 2 weeks for his lumbar radiculopathy.    He also has a new complaint of alopecia x5mo, including eyebrow and moustache loss in addition to patchy loss on the scalp.   Back Pain  This is a recurrent problem. The current episode started more than 1 month ago. The problem occurs every several days. The problem is unchanged. The pain is present in the sacro-iliac. The quality of the pain is described as burning. The pain radiates to the right knee. The pain is at a severity of 6/10. The pain is worse during the day. The symptoms are aggravated by standing. Stiffness is present all day. Associated symptoms include leg pain and numbness. Pertinent negatives include no abdominal pain, bladder incontinence, bowel incontinence, chest pain, dysuria, fever, headaches, paresis, paresthesias, pelvic pain, perianal numbness, tingling, weakness or weight loss. Risk factors include poor posture.          Current Outpatient Medications:   •  albuterol (ProAir RespiClick) 108 (90 Base) MCG/ACT inhaler, Inhale 1 puff Every 6 (Six) Hours As Needed for Shortness of Air., Disp: 1 each, Rfl: 3  •  Breo Ellipta 100-25 MCG/ACT aerosol powder , Inhale 1 puff Daily., Disp: 28 each, Rfl: 3  •  pramipexole (Mirapex) 0.25 MG tablet, Take 1 tablet by mouth Every Night., Disp: 90 tablet, Rfl: 3  •  vitamin D (ERGOCALCIFEROL) 1.25 MG (70843 UT) capsule capsule, Take 1 capsule by mouth 1 (One) Time Per Week., Disp: 5 capsule, Rfl: 2  •  gabapentin (NEURONTIN) 300 MG capsule, Take 1 capsule by mouth 2 (Two) Times a Day., Disp: 60 capsule, Rfl: 1    The following portions of the patient's history were  reviewed and updated as appropriate: allergies, current medications, past family history, past medical history, past social history, past surgical history and problem list.    Review of Systems   Constitutional: Negative for chills, fatigue, fever and weight loss.   HENT: Negative.    Cardiovascular: Negative for chest pain.   Gastrointestinal: Negative for abdominal pain, bowel incontinence, nausea and vomiting.   Endocrine: Negative for cold intolerance, heat intolerance, polydipsia and polyuria.   Genitourinary: Negative for bladder incontinence, dysuria and pelvic pain.   Musculoskeletal: Positive for back pain.   Neurological: Positive for numbness. Negative for tingling, weakness, headaches and paresthesias.       Objective   Physical Exam  Constitutional:       Appearance: He is well-developed.   Cardiovascular:      Rate and Rhythm: Normal rate and regular rhythm.      Heart sounds: Normal heart sounds.   Pulmonary:      Effort: Pulmonary effort is normal.      Breath sounds: Wheezing and rhonchi present.   Abdominal:      General: Bowel sounds are normal.      Palpations: Abdomen is soft.   Musculoskeletal:      Cervical back: Neck supple.   Neurological:      Mental Status: He is alert and oriented to person, place, and time.   Psychiatric:         Behavior: Behavior normal.         All tests have been reviewed.    Assessment & Plan   Diagnoses and all orders for this visit:    Vitamin D deficiency, unspecified cont med   - Recheck    Hyperglycemia a1c  Diet    - Recheck A1c 7.3 encourage patient to have a good diet we will repeat next time if continues to be high we will initiate medication    Right lumbar radiculopathy start neurontin    - Pain management to follow       Handicapped placard given    Chronic hepatitis C without hepatic coma   - GI to follow    Leg cramp   - Improved with mirapex cont     Tobacco abuse   - Pt has quit smoking x11 days    Alopecia rogaine and ref derm for triamcinolone inj        1 mo

## 2023-04-14 ENCOUNTER — HOSPITAL ENCOUNTER (OUTPATIENT)
Dept: MRI IMAGING | Facility: HOSPITAL | Age: 68
Discharge: HOME | End: 2023-04-14
Admitting: INTERNAL MEDICINE
Payer: MEDICARE

## 2023-04-14 DIAGNOSIS — M54.16 RIGHT LUMBAR RADICULOPATHY: ICD-10-CM

## 2023-04-14 PROCEDURE — 72148 MRI LUMBAR SPINE W/O DYE: CPT

## 2023-04-19 DIAGNOSIS — M51.26 LUMBAR DISC HERNIATION: Primary | ICD-10-CM

## 2023-05-30 ENCOUNTER — OFFICE VISIT (OUTPATIENT)
Dept: INTERNAL MEDICINE | Facility: CLINIC | Age: 68
End: 2023-05-30

## 2023-05-30 VITALS
WEIGHT: 146 LBS | HEART RATE: 62 BPM | OXYGEN SATURATION: 97 % | BODY MASS INDEX: 24.92 KG/M2 | DIASTOLIC BLOOD PRESSURE: 64 MMHG | SYSTOLIC BLOOD PRESSURE: 122 MMHG | HEIGHT: 64 IN | TEMPERATURE: 97.6 F

## 2023-05-30 DIAGNOSIS — E11.65 TYPE 2 DIABETES MELLITUS WITH HYPERGLYCEMIA, WITHOUT LONG-TERM CURRENT USE OF INSULIN: Primary | ICD-10-CM

## 2023-05-30 DIAGNOSIS — E55.9 VITAMIN D DEFICIENCY, UNSPECIFIED: ICD-10-CM

## 2023-05-30 PROCEDURE — 3051F HG A1C>EQUAL 7.0%<8.0%: CPT | Performed by: INTERNAL MEDICINE

## 2023-05-30 PROCEDURE — 99214 OFFICE O/P EST MOD 30 MIN: CPT | Performed by: INTERNAL MEDICINE

## 2023-05-30 PROCEDURE — 1160F RVW MEDS BY RX/DR IN RCRD: CPT | Performed by: INTERNAL MEDICINE

## 2023-05-30 PROCEDURE — 1159F MED LIST DOCD IN RCRD: CPT | Performed by: INTERNAL MEDICINE

## 2023-05-30 RX ORDER — METFORMIN HYDROCHLORIDE 500 MG/1
500 TABLET, EXTENDED RELEASE ORAL
Qty: 30 TABLET | Refills: 2 | Status: SHIPPED | OUTPATIENT
Start: 2023-05-30

## 2023-05-30 NOTE — PROGRESS NOTES
Subjective   Peter Ghotra is a 68 y.o. male.     Chief Complaint   Patient presents with   • Vitamin D Deficiency   • Hyperglycemia       History of Present Illness   Patient here for follow-up.  Vitamin D deficiency on supplement.  Repeat A1c 7.4 and the last time was 7.3.  Lumbar radiculopathy Neurontin no help pain management note block helped.  Patient again smoking now.  Alopecia improved after medication.  Leg cramping improved after MiraLAX    Current Outpatient Medications:   •  albuterol (ProAir RespiClick) 108 (90 Base) MCG/ACT inhaler, Inhale 1 puff Every 6 (Six) Hours As Needed for Shortness of Air., Disp: 1 each, Rfl: 3  •  Breo Ellipta 100-25 MCG/ACT aerosol powder , Inhale 1 puff Daily., Disp: 28 each, Rfl: 3  •  pramipexole (Mirapex) 0.25 MG tablet, Take 1 tablet by mouth Every Night., Disp: 90 tablet, Rfl: 3  •  gabapentin (NEURONTIN) 300 MG capsule, Take 1 capsule by mouth 2 (Two) Times a Day., Disp: 60 capsule, Rfl: 1  •  metFORMIN ER (GLUCOPHAGE-XR) 500 MG 24 hr tablet, Take 1 tablet by mouth Daily With Breakfast., Disp: 30 tablet, Rfl: 2  •  vitamin D (ERGOCALCIFEROL) 1.25 MG (73074 UT) capsule capsule, Take 1 capsule by mouth 1 (One) Time Per Week., Disp: 5 capsule, Rfl: 2    The following portions of the patient's history were reviewed and updated as appropriate: allergies, current medications, past family history, past medical history, past social history, past surgical history and problem list.    Review of Systems   Constitutional: Negative.    Respiratory: Negative.    Cardiovascular: Negative.    Gastrointestinal: Negative.    Musculoskeletal: Negative.    Skin: Negative.    Neurological: Negative.    Psychiatric/Behavioral: Negative.        Objective   Physical Exam  Cardiovascular:      Rate and Rhythm: Normal rate and regular rhythm.      Heart sounds: Normal heart sounds.   Pulmonary:      Effort: Pulmonary effort is normal.      Breath sounds: Normal breath sounds.    Abdominal:      General: Bowel sounds are normal.   Musculoskeletal:      Cervical back: Neck supple.   Skin:     General: Skin is warm.   Neurological:      Mental Status: He is alert and oriented to person, place, and time.         All tests have been reviewed.    Assessment & Plan   Diagnoses and all orders for this visit:    Type 2 diabetes mellitus with hyperglycemia, without long-term current use of insulin.  Newly diagnosed initiate medication encourage patient to have good diet check labs in 2 months  -     metFORMIN ER (GLUCOPHAGE-XR) 500 MG 24 hr tablet; Take 1 tablet by mouth Daily With Breakfast.  -     Vitamin D,25-Hydroxy  -     Hemoglobin A1c  -     Basic Metabolic Panel    Vitamin D deficiency, unspecified continue supplement check lab in 2 months  -     Vitamin D,25-Hydroxy    Right lumbar radiculopathy neurontin no help follow pain Mx for nerve block.      Chronic hepatitis C without hepatic coma              - GI to follow     Leg cramp              - Improved with mirapex cont      Tobacco abuse to quit      Alopecia rogaine and ref derm for triamcinolone inj      2 months follow-up after blood tests     Answers for HPI/ROS submitted by the patient on 5/29/2023  What is the primary reason for your visit?: Other  Please describe your symptoms.: Constantly having back pain  Have you had these symptoms before?: Yes  How long have you been having these symptoms?: Greater than 2 weeks  Please list any medications you are currently taking for this condition.: Tylenol  Please describe any probable cause for these symptoms. : Setting or standing for a long period of time

## 2023-06-05 ENCOUNTER — HOSPITAL ENCOUNTER (OUTPATIENT)
Dept: GENERAL RADIOLOGY | Facility: HOSPITAL | Age: 68
Discharge: HOME | End: 2023-06-05
Payer: MEDICARE

## 2023-06-05 ENCOUNTER — OFFICE VISIT (OUTPATIENT)
Dept: NEUROSURGERY | Facility: CLINIC | Age: 68
End: 2023-06-05
Payer: MEDICARE

## 2023-06-05 VITALS
WEIGHT: 147.4 LBS | BODY MASS INDEX: 25.16 KG/M2 | HEIGHT: 64 IN | SYSTOLIC BLOOD PRESSURE: 130 MMHG | DIASTOLIC BLOOD PRESSURE: 70 MMHG

## 2023-06-05 DIAGNOSIS — M25.552 BILATERAL HIP PAIN: ICD-10-CM

## 2023-06-05 DIAGNOSIS — M25.551 BILATERAL HIP PAIN: Primary | ICD-10-CM

## 2023-06-05 DIAGNOSIS — M54.50 CHRONIC BILATERAL LOW BACK PAIN WITHOUT SCIATICA: ICD-10-CM

## 2023-06-05 DIAGNOSIS — M25.552 BILATERAL HIP PAIN: Primary | ICD-10-CM

## 2023-06-05 DIAGNOSIS — M25.551 BILATERAL HIP PAIN: ICD-10-CM

## 2023-06-05 DIAGNOSIS — G89.29 CHRONIC BILATERAL LOW BACK PAIN WITHOUT SCIATICA: ICD-10-CM

## 2023-06-05 DIAGNOSIS — Z72.0 TOBACCO ABUSE: ICD-10-CM

## 2023-06-05 PROCEDURE — 72110 X-RAY EXAM L-2 SPINE 4/>VWS: CPT

## 2023-06-05 PROCEDURE — 99204 OFFICE O/P NEW MOD 45 MIN: CPT | Performed by: NEUROLOGICAL SURGERY

## 2023-06-05 PROCEDURE — 73522 X-RAY EXAM HIPS BI 3-4 VIEWS: CPT

## 2023-06-05 NOTE — PROGRESS NOTES
"Subjective     Chief Complaint: Back pain    Patient ID: Peter Ghotra is a 68 y.o. male seen for consultation today at the request of  Sukhjinder Duncan MD    Back Pain      This is a 68-year-old man who presents to my office with chief complaints of a longstanding history of low back pain with bilateral hip and leg pain.  He has tried physical therapy and interventional pain management without relief.  He is a  and spends many hours a day on his feet.  He smokes tobacco and has diabetes.    The following portions of the patient's history were reviewed and updated as appropriate: allergies, current medications, past family history, past medical history, past social history, past surgical history and problem list.    Family history:   Family History   Problem Relation Age of Onset    Arthritis Father        Social history:   Social History     Socioeconomic History    Marital status: Single   Tobacco Use    Smoking status: Every Day     Packs/day: 1.00     Years: 15.00     Pack years: 15.00     Types: Cigarettes    Smokeless tobacco: Never   Vaping Use    Vaping Use: Some days    Substances: Nicotine    Devices: Disposable   Substance and Sexual Activity    Alcohol use: Yes     Alcohol/week: 2.0 standard drinks     Types: 2 Cans of beer per week    Drug use: Never    Sexual activity: Not Currently       Review of Systems   Musculoskeletal:  Positive for back pain.     Objective   Blood pressure 130/70, height 162.6 cm (64\"), weight 66.9 kg (147 lb 6.4 oz).  Body mass index is 25.3 kg/m².    Physical Exam  Vitals reviewed.   Constitutional:       General: He is not in acute distress.     Appearance: He is well-developed. He is not diaphoretic.   HENT:      Head: Normocephalic and atraumatic.   Pulmonary:      Effort: Pulmonary effort is normal.   Musculoskeletal:      Lumbar back: Negative right straight leg raise test and negative left straight leg raise test.      Right hip: Tenderness present. Decreased " range of motion.      Left hip: Tenderness present. Decreased range of motion.      Comments: Negative straight leg raise test without reproduction of sciatica, however poor hamstring flexibility is noted.  He is tender to palpation over his greater trochanteric bursa bilaterally as well as along the iliotibial band.   Skin:     General: Skin is warm and dry.   Neurological:      Mental Status: He is alert and oriented to person, place, and time.      Deep Tendon Reflexes: Reflexes abnormal.      Comments: Muscle stretch reflexes are symmetric and hyporeactive.  Casual toe raise, and heel raise gait are performed with difficulty but no focal motor weakness is detected.  Station is intact.  Tandem gait testing is deferred.   Psychiatric:         Behavior: Behavior normal.       Assessment & Plan     Independent Review of Radiographic Studies:      Available for my review is a MRI of the lumbar spine which was performed on April 14, 2023.  There is diffuse degenerative disc disease which is moderate, bordering on severe.  The central canal is capacious.  Lumbar lordosis is preserved.  There are large anterior osteophytes spanning from T12-L3.  There is near complete loss of disc height at L2-3 and at L3-4.    Medical Decision Making:      This is a 68-year-old man with what sounds like axial low back pain and probably bilateral hip pain with iliotibial band syndrome.  I have ordered x-rays of his hips as well as flexion-extension films of his lumbar spine.  I doubt that I will have anything surgical to offer him, but if there is evidence of spondylolisthesis then we could talk about a fusion if he is going to quit smoking.  I will follow-up with him once the studies have been completed.    Diagnoses and all orders for this visit:    1. Bilateral hip pain (Primary)  -     XR Spine Lumbar AP & Lateral With Flex & Ext; Future  -     XR hips bilateral w or wo pelvis 3-4 view; Future    2. Chronic bilateral low back pain  without sciatica  -     XR Spine Lumbar AP & Lateral With Flex & Ext; Future  -     XR hips bilateral w or wo pelvis 3-4 view; Future    3. Tobacco abuse        No follow-ups on file.           This document signed by LUMA Keenan MD June 5, 2023 13:06 EDT

## 2023-06-08 ENCOUNTER — OFFICE VISIT (OUTPATIENT)
Dept: GASTROENTEROLOGY | Facility: CLINIC | Age: 68
End: 2023-06-08
Payer: MEDICARE

## 2023-06-08 VITALS
HEART RATE: 68 BPM | DIASTOLIC BLOOD PRESSURE: 68 MMHG | OXYGEN SATURATION: 98 % | BODY MASS INDEX: 25.73 KG/M2 | WEIGHT: 149.9 LBS | SYSTOLIC BLOOD PRESSURE: 128 MMHG

## 2023-06-08 DIAGNOSIS — Z86.19 HISTORY OF HEPATITIS C VIRUS INFECTION: ICD-10-CM

## 2023-06-08 DIAGNOSIS — Z12.12 ENCOUNTER FOR COLORECTAL CANCER SCREENING: Primary | ICD-10-CM

## 2023-06-08 DIAGNOSIS — Z12.11 ENCOUNTER FOR COLORECTAL CANCER SCREENING: Primary | ICD-10-CM

## 2023-06-08 RX ORDER — SODIUM CHLORIDE 9 MG/ML
30 INJECTION, SOLUTION INTRAVENOUS CONTINUOUS PRN
OUTPATIENT
Start: 2023-06-08

## 2023-06-08 RX ORDER — SODIUM, POTASSIUM,MAG SULFATES 17.5-3.13G
SOLUTION, RECONSTITUTED, ORAL ORAL
Qty: 354 ML | Refills: 0 | Status: SHIPPED | OUTPATIENT
Start: 2023-06-08

## 2023-06-08 NOTE — PROGRESS NOTES
New Patient Consult      Date: 2023   Patient Name: Peter Ghotra  MRN: [id number]  : 1955     Primary Care Provider: Sukhjinder Duncan MD  Referring Provider: Perla    Chief Complaint   Patient presents with    Colon Cancer Screening     History of Present Illness: Peter Ghotra is a 68 y.o. male who is here today as a consultation with Gastroenterology for evaluation of Colon Cancer Screening.    He was directed by his PCP to have colon cancer screening colonoscopy. He has never had a colonoscopy. There is no known family history of colon cancer or colon polyps. His bowel habits are normal per his report. No constipation or diarrhea. No rectal bleeding. No current abdominal pain.     3-4 years ago, he had treatment for Hep C, was told he was cured in [facility], not sure of medication he took but thinks it was epclusa for 12 weeks. He does drink 3 beers per week, no prior heavy alcohol use. He used illicit drugs when he was in his teens, no current illicit drug use.     Subjective      Past Medical History:   Diagnosis Date    Anxiety     Arthritis     Asthma     Cataract     COPD (chronic obstructive pulmonary disease)     Diabetes mellitus     Low back pain 2017    Visual impairment      Past Surgical History:   Procedure Laterality Date    CATARACT EXTRACTION W/ INTRAOCULAR LENS IMPLANT Right 2023    Procedure: CATARACT PHACO EXTRACTION WITH INTRAOCULAR LENS IMPLANT RIGHT;  Surgeon: Alesha Ennis MD;  Location: [facility];  Service: Ophthalmology;  Laterality: Right;    EYE SURGERY       Family History   Problem Relation Age of Onset    Arthritis Father      Social History     Socioeconomic History    Marital status: Single   Tobacco Use    Smoking status: Every Day     Packs/day: 1.00     Years: 15.00     Pack years: 15.00     Types: Cigarettes    Smokeless tobacco: Never   Vaping Use    Vaping Use: Some days    Substances: Nicotine    Devices: Disposable   Substance and  Sexual Activity    Alcohol use: Yes     Alcohol/week: 2.0 standard drinks     Types: 2 Cans of beer per week    Drug use: Never    Sexual activity: Not Currently     Partners: Female     Current Outpatient Medications:     albuterol (ProAir RespiClick) 108 (90 Base) MCG/ACT inhaler, Inhale 1 puff Every 6 (Six) Hours As Needed for Shortness of Air., Disp: 1 each, Rfl: 3    Breo Ellipta 100-25 MCG/ACT aerosol powder , Inhale 1 puff Daily., Disp: 28 each, Rfl: 3    metFORMIN ER (GLUCOPHAGE-XR) 500 MG 24 hr tablet, Take 1 tablet by mouth Daily With Breakfast., Disp: 30 tablet, Rfl: 2    No Known Allergies    The following portions of the patient's history were reviewed and updated as appropriate: allergies, current medications, past family history, past medical history, past social history, past surgical history and problem list.    Objective     Physical Exam  Vitals reviewed.   Constitutional:       General: He is not in acute distress.     Appearance: Normal appearance. He is well-developed. He is not ill-appearing or diaphoretic.   HENT:      Head: Normocephalic and atraumatic.      Right Ear: External ear normal.      Left Ear: External ear normal.      Nose: Nose normal.   Eyes:      General: No scleral icterus.        Right eye: No discharge.         Left eye: No discharge.      Conjunctiva/sclera: Conjunctivae normal.   Neck:      Vascular: No JVD.   Cardiovascular:      Rate and Rhythm: Normal rate and regular rhythm.      Heart sounds: Normal heart sounds. No murmur heard.    No friction rub. No gallop.   Pulmonary:      Effort: Pulmonary effort is normal. No respiratory distress.      Breath sounds: Wheezing and rhonchi present. No rales.   Chest:      Chest wall: No tenderness.   Abdominal:      General: Bowel sounds are normal. There is no distension.      Palpations: Abdomen is soft. There is no mass.      Tenderness: There is no abdominal tenderness. There is no guarding.   Musculoskeletal:          General: No deformity. Normal range of motion.      Cervical back: Normal range of motion.   Skin:     General: Skin is warm and dry.      Findings: No erythema or rash.   Neurological:      Mental Status: He is alert and oriented to person, place, and time.      Coordination: Coordination normal.   Psychiatric:         Mood and Affect: Mood normal.         Behavior: Behavior normal.         Thought Content: Thought content normal.         Judgment: Judgment normal.       Vitals:    06/08/23 1006   BP: 128/68   Pulse: 68   SpO2: 98%   Weight: 68 kg (149 lb 14.4 oz)     Results Review:   I have reviewed the patient's new clinical and imaging results.    Labs 2/8/2023 AST 14, ALT 16, alk phos 34, bili 0.9, platelets 188,000, Hgb 15.9, HCT 47.2.     No recent abdominal imaging to review.     Assessment / Plan      1. Encounter for colorectal cancer screening  He has never had a colonoscopy. There is no known family history of colon cancer or colon polyps. Will arrange colonoscopy due to age, 68.     He will have a colonoscopy performed with monitored anesthesia care. The indications, technique, alternatives and potential risk and complications were discussed with the patient including but not limited to bleeding, bowel perforations, missing lesions and anesthetic complications. The patient understands and wishes to proceed with the procedure and has given their verbal consent. Written patient education information was given to the patient. He should follow up in the office after this procedure to discuss the results and further recommendations can be made at that time. The patient will call if they have further questions before procedure.  - Case Request  - sodium-potassium-magnesium sulfates (SUPREP) 17.5-3.13-1.6 GM/177ML solution oral solution; Take 2 bottles by mouth 1 (One) Time for 1 dose. Please see prep instructions from office  Dispense: 354 mL; Refill: 0    2. History of hepatitis C virus infection  He has  history of Hep C infection. Had previous treatment a few years ago per his report, thinks he took 12 weeks of Epclusa. Had positive Hep C lab recently. Has previous illicit drug use but none now. Liver enzymes normal on labs 2/2023.     Offered confirmation lab with HCV RNA quant, he declined      Follow Up:   Return for follow up after procedure to discuss results.    Dena Momin PA-C  [facility] [place]  6/8/2023  16:03 EDT    Dictated Utilizing Dragon Dictation: Part of this note may be an electronic transcription/translation of spoken language to printed text using the Dragon Dictation System.

## 2023-07-25 ENCOUNTER — TELEPHONE (OUTPATIENT)
Dept: GASTROENTEROLOGY | Facility: CLINIC | Age: 68
End: 2023-07-25
Payer: MEDICARE

## 2023-07-25 ENCOUNTER — OFFICE VISIT (OUTPATIENT)
Dept: INTERNAL MEDICINE | Facility: CLINIC | Age: 68
End: 2023-07-25
Payer: MEDICARE

## 2023-07-25 VITALS
TEMPERATURE: 97.7 F | WEIGHT: 146 LBS | SYSTOLIC BLOOD PRESSURE: 120 MMHG | BODY MASS INDEX: 24.92 KG/M2 | OXYGEN SATURATION: 96 % | DIASTOLIC BLOOD PRESSURE: 80 MMHG | HEART RATE: 67 BPM | HEIGHT: 64 IN

## 2023-07-25 DIAGNOSIS — E55.9 VITAMIN D DEFICIENCY, UNSPECIFIED: Primary | ICD-10-CM

## 2023-07-25 DIAGNOSIS — R73.9 HYPERGLYCEMIA: ICD-10-CM

## 2023-07-25 DIAGNOSIS — L65.9 ALOPECIA: ICD-10-CM

## 2023-07-25 DIAGNOSIS — B18.2 CHRONIC HEPATITIS C WITHOUT HEPATIC COMA: ICD-10-CM

## 2023-07-25 DIAGNOSIS — M54.16 RIGHT LUMBAR RADICULOPATHY: ICD-10-CM

## 2023-07-25 PROCEDURE — 3051F HG A1C>EQUAL 7.0%<8.0%: CPT | Performed by: INTERNAL MEDICINE

## 2023-07-25 PROCEDURE — 99213 OFFICE O/P EST LOW 20 MIN: CPT | Performed by: INTERNAL MEDICINE

## 2023-07-25 RX ORDER — OXYCODONE AND ACETAMINOPHEN 10; 325 MG/1; MG/1
1 TABLET ORAL EVERY 12 HOURS PRN
COMMUNITY
Start: 2023-07-07

## 2023-07-25 NOTE — PROGRESS NOTES
Subjective   Peter Ghotra is a 68 y.o. male.     Chief Complaint   Patient presents with    Diabetes    Vitamin D Deficiency       Diabetes     Patient here for follow-up.  Diabetes the patient yet to do blood tests.  Vitamin D deficient patient yet to do blood test.  Back pain back brace helps hepatitis C patient is seeing liver doctor leg cramping improved patient quit tobacco for 1 month alopecia resolved after treatment.    Current Outpatient Medications:     albuterol (ProAir RespiClick) 108 (90 Base) MCG/ACT inhaler, Inhale 1 puff Every 6 (Six) Hours As Needed for Shortness of Air., Disp: 1 each, Rfl: 3    Breo Ellipta 100-25 MCG/ACT aerosol powder , Inhale 1 puff Daily., Disp: 28 each, Rfl: 3    metFORMIN ER (GLUCOPHAGE-XR) 500 MG 24 hr tablet, Take 1 tablet by mouth Daily With Breakfast., Disp: 30 tablet, Rfl: 2    oxyCODONE-acetaminophen (PERCOCET)  MG per tablet, Take 1 tablet by mouth Every 12 (Twelve) Hours As Needed., Disp: , Rfl:     sodium-potassium-magnesium sulfates (SUPREP) 17.5-3.13-1.6 GM/177ML solution oral solution, Take 2 bottles by mouth 1 (One) Time for 1 dose. Please see prep instructions from office, Disp: 354 mL, Rfl: 0    The following portions of the patient's history were reviewed and updated as appropriate: allergies, current medications, past family history, past medical history, past social history, past surgical history, and problem list.    Review of Systems   Constitutional: Negative.    Respiratory: Negative.     Cardiovascular: Negative.    Gastrointestinal: Negative.    Musculoskeletal: Negative.    Skin: Negative.    Neurological: Negative.    Psychiatric/Behavioral: Negative.       Objective   Physical Exam  Cardiovascular:      Rate and Rhythm: Normal rate and regular rhythm.      Heart sounds: Normal heart sounds.   Pulmonary:      Effort: Pulmonary effort is normal.      Breath sounds: Normal breath sounds.   Abdominal:      General: Bowel sounds are normal.    Musculoskeletal:      Cervical back: Neck supple.   Skin:     General: Skin is warm.   Neurological:      Mental Status: He is alert and oriented to person, place, and time.   Psychiatric:         Behavior: Behavior normal.       All tests have been reviewed.    Assessment & Plan   There are no diagnoses linked to this encounter.      Type 2 diabetes mellitus with hyperglycemia, without long-term current use of insulin.  Newly diagnosed initiate medication encourage patient to have good diet check labs in 2 months  -     metFORMIN ER (GLUCOPHAGE-XR) 500 MG 24 hr tablet; Take 1 tablet by mouth Daily With Breakfast.  -     Vitamin D,25-Hydroxy  -     Hemoglobin A1c  -     Basic Metabolic Panel     Vitamin D deficiency, unspecified continue supplement check lab in 2 months  -     Vitamin D,25-Hydroxy     Right lumbar radiculopathy neurontin no help follow pain Mx for nerve block. back brace helping      Chronic hepatitis C without hepatic coma              - GI to follow     Leg cramp              - Improved with mirapex cont      Tobacco abuse quit for 1 mo      Alopecia rogaine  triamcinolone inj  resolved     Do lab now so I like for you to see them   6 mo Answers submitted by the patient for this visit:  Primary Reason for Visit (Submitted on 7/25/2023)  What is the primary reason for your visit?: Other  Other (Submitted on 7/25/2023)  Please describe your symptoms.: Back pain  Have you had these symptoms before?: Yes  How long have you been having these symptoms?: Greater than 2 weeks

## 2023-07-25 NOTE — TELEPHONE ENCOUNTER
I called the the patient but no option to leave VM. The patient is to call back and confirm procedure. Ok to leave conformation with HUB.

## 2023-08-03 ENCOUNTER — TELEPHONE (OUTPATIENT)
Dept: INTERNAL MEDICINE | Facility: CLINIC | Age: 68
End: 2023-08-03
Payer: MEDICARE

## 2023-08-21 DIAGNOSIS — J43.1 PANLOBULAR EMPHYSEMA: ICD-10-CM

## 2023-08-21 RX ORDER — ALBUTEROL SULFATE 90 UG/1
POWDER, METERED RESPIRATORY (INHALATION)
Qty: 1 EACH | Refills: 1 | Status: SHIPPED | OUTPATIENT
Start: 2023-08-21

## 2023-08-21 NOTE — TELEPHONE ENCOUNTER
Rx Refill Note  Requested Prescriptions     Pending Prescriptions Disp Refills    ProAir RespiClick 108 (90 Base) MCG/ACT inhaler [Pharmacy Med Name: PROAIR RESPICLICK ORAL PWD INH(200)] 1 each 3     Sig: INHALE 1 PUFF BY MOUTH EVERY 6 HOURS AS NEEDED FOR SHORTNESS OF BREATH      Last office visit with prescribing clinician: 7/25/2023   Next office visit with prescribing clinician: 10/30/23      Carlita Dang MA  08/21/23, 13:16 EDT

## 2023-09-21 ENCOUNTER — TELEPHONE (OUTPATIENT)
Dept: GASTROENTEROLOGY | Facility: CLINIC | Age: 68
End: 2023-09-21

## 2023-09-21 NOTE — TELEPHONE ENCOUNTER
Pt notified to call the pharmacy on his prep as it was sent in. I resent his instructions via [employer].

## 2023-09-21 NOTE — TELEPHONE ENCOUNTER
Caller: JOSE PAREDES    Relationship: SELF    Best call back number: [phone]     Requested Prescriptions: [facility]    Pharmacy where request should be sent:  [facility] [address]    Additional details provided by patient: PATIENT HAS A COLONOSCOPY SCHEDULED FOR 09/25/23, BUT HE DOES NOT HAVE HIS PREP OR THE INSTRUCTIONS. [facility]  LARA ROCHA ADVISED PATIENT TO CHECK IN HIS MYCHART FOR PREP INSTRUCTIONS. IF HE CAN NOT FIND THE INSTRUCTIONS, HE WILL COME TO THE OFFICE FOR A PHYSICAL COPY.    Would you like a call back once the refill request has been completed: [] Yes [x] No

## 2023-09-21 NOTE — TELEPHONE ENCOUNTER
Caller: Peter Ghotra    Relationship: Self    Best call back number: [phone]     What is the best time to reach you: ANYTIME    Who are you requesting to speak with (clinical staff, provider,  specific staff member): CLINICAL     What was the call regarding: PATIENT HAS A COLONOSCOPY SCHEDULED WITH DR MAURICE ON 9/25 AND THEY NEED THE PREP MEDS SENT TO THE FOLLOWING PHARMACY:    [address] [place] [address]  # [phone]

## 2023-09-22 ENCOUNTER — TELEPHONE (OUTPATIENT)
Dept: GASTROENTEROLOGY | Facility: CLINIC | Age: 68
End: 2023-09-22
Payer: MEDICARE

## 2023-09-22 DIAGNOSIS — Z12.12 ENCOUNTER FOR COLORECTAL CANCER SCREENING: ICD-10-CM

## 2023-09-22 DIAGNOSIS — Z12.11 ENCOUNTER FOR COLORECTAL CANCER SCREENING: ICD-10-CM

## 2023-09-22 RX ORDER — SODIUM, POTASSIUM,MAG SULFATES 17.5-3.13G
SOLUTION, RECONSTITUTED, ORAL ORAL
Qty: 354 ML | Refills: 0 | Status: SHIPPED | OUTPATIENT
Start: 2023-09-22 | End: 2023-09-25 | Stop reason: HOSPADM

## 2023-09-22 NOTE — TELEPHONE ENCOUNTER
----- Message from Aura Quintero MA sent at 9/22/2023  9:29 AM EDT -----  Patient is scheduled for colon on Monday. Needs prep resent to pharmacy

## 2023-09-25 ENCOUNTER — HOSPITAL ENCOUNTER (OUTPATIENT)
Facility: HOSPITAL | Age: 68
Setting detail: HOSPITAL OUTPATIENT SURGERY
Discharge: HOME | End: 2023-09-25
Attending: INTERNAL MEDICINE | Admitting: INTERNAL MEDICINE
Payer: MEDICARE

## 2023-09-25 ENCOUNTER — ANESTHESIA EVENT (OUTPATIENT)
Dept: GASTROENTEROLOGY | Facility: HOSPITAL | Age: 68
End: 2023-09-25

## 2023-09-25 ENCOUNTER — ANESTHESIA (OUTPATIENT)
Dept: GASTROENTEROLOGY | Facility: HOSPITAL | Age: 68
End: 2023-09-25

## 2023-09-25 VITALS
HEART RATE: 61 BPM | RESPIRATION RATE: 16 BRPM | OXYGEN SATURATION: 93 % | SYSTOLIC BLOOD PRESSURE: 130 MMHG | TEMPERATURE: 96.9 F | WEIGHT: 147 LBS | HEIGHT: 64 IN | BODY MASS INDEX: 25.1 KG/M2 | DIASTOLIC BLOOD PRESSURE: 70 MMHG

## 2023-09-25 DIAGNOSIS — Z12.12 ENCOUNTER FOR COLORECTAL CANCER SCREENING: ICD-10-CM

## 2023-09-25 DIAGNOSIS — Z12.11 ENCOUNTER FOR COLORECTAL CANCER SCREENING: ICD-10-CM

## 2023-09-25 LAB — GLUCOSE BLDC GLUCOMTR-MCNC: 121 MG/DL (ref 70–130)

## 2023-09-25 PROCEDURE — 88300 SURGICAL PATH GROSS: CPT

## 2023-09-25 PROCEDURE — 82948 REAGENT STRIP/BLOOD GLUCOSE: CPT

## 2023-09-25 PROCEDURE — 88305 TISSUE EXAM BY PATHOLOGIST: CPT

## 2023-09-25 PROCEDURE — 25010000002 PROPOFOL 10 MG/ML EMULSION: Performed by: NURSE ANESTHETIST, CERTIFIED REGISTERED

## 2023-09-25 RX ORDER — SIMETHICONE 20 MG/.3ML
EMULSION ORAL AS NEEDED
Status: DISCONTINUED | OUTPATIENT
Start: 2023-09-25 | End: 2023-09-25 | Stop reason: HOSPADM

## 2023-09-25 RX ORDER — ONDANSETRON 2 MG/ML
4 INJECTION INTRAMUSCULAR; INTRAVENOUS ONCE AS NEEDED
Status: CANCELLED | OUTPATIENT
Start: 2023-09-25 | End: 2023-09-25

## 2023-09-25 RX ORDER — PROPOFOL 10 MG/ML
VIAL (ML) INTRAVENOUS AS NEEDED
Status: DISCONTINUED | OUTPATIENT
Start: 2023-09-25 | End: 2023-09-25 | Stop reason: SURG

## 2023-09-25 RX ORDER — LIDOCAINE HYDROCHLORIDE 20 MG/ML
INJECTION, SOLUTION INTRAVENOUS AS NEEDED
Status: DISCONTINUED | OUTPATIENT
Start: 2023-09-25 | End: 2023-09-25 | Stop reason: SURG

## 2023-09-25 RX ORDER — SODIUM CHLORIDE 9 MG/ML
30 INJECTION, SOLUTION INTRAVENOUS CONTINUOUS PRN
Status: DISCONTINUED | OUTPATIENT
Start: 2023-09-25 | End: 2023-09-25 | Stop reason: HOSPADM

## 2023-09-25 RX ADMIN — PROPOFOL 200 MG: 10 INJECTION, EMULSION INTRAVENOUS at 08:19

## 2023-09-25 RX ADMIN — LIDOCAINE HYDROCHLORIDE 60 MG: 20 INJECTION, SOLUTION INTRAVENOUS at 08:19

## 2023-09-25 RX ADMIN — SODIUM CHLORIDE 30 ML/HR: 9 INJECTION, SOLUTION INTRAVENOUS at 07:50

## 2023-09-25 NOTE — ANESTHESIA POSTPROCEDURE EVALUATION
Patient: Peter Ghotra    Procedure Summary       Date: 09/25/23 Room / Location: [facility] ENDOSCOPY 2 / [facility] ENDOSCOPY    Anesthesia Start: 0815 Anesthesia Stop: 0848    Procedure: COLONOSCOPY with polypectomy Diagnosis:       Encounter for colorectal cancer screening      (Encounter for colorectal cancer screening [Z12.11, Z12.12])    Surgeons: Pavan Parrish MD Provider: Owen Boykin CRNA    Anesthesia Type: MAC ASA Status: 3            Anesthesia Type: MAC    Vitals  Vitals Value Taken Time   BP 95/62 09/25/23 0850   Temp 96.9 °F (36.1 °C) 09/25/23 0850   Pulse 61 09/25/23 0850   Resp 14 09/25/23 0850   SpO2 93 % 09/25/23 0850           Post Anesthesia Care and Evaluation    Patient location during evaluation: PHASE II  Patient participation: complete - patient participated  Level of consciousness: awake  Pain score: 1  Pain management: adequate    Airway patency: patent  Anesthetic complications: No anesthetic complications  PONV Status: controlled  Cardiovascular status: acceptable and stable  Respiratory status: acceptable  Hydration status: acceptable    Comments: See Nursing record for post procedural Vital Signs as per protocol.

## 2023-09-25 NOTE — ANESTHESIA PREPROCEDURE EVALUATION
Anesthesia Evaluation     Patient summary reviewed and Nursing notes reviewed   no history of anesthetic complications:   NPO Solid Status: > 8 hours  NPO Liquid Status: > 8 hours           Airway   Mallampati: II  TM distance: >3 FB  Neck ROM: full  no difficulty expected and Possible difficult intubation  Dental - normal exam     Pulmonary - normal exam   (+) a smoker Current Smoked day of surgery, COPD moderate, asthma,  Cardiovascular - negative cardio ROS and normal exam    Rhythm: regular  Rate: normal        Neuro/Psych  (+) numbness, psychiatric history Anxiety and Depression  GI/Hepatic/Renal/Endo    (+) hepatitis C, liver disease fatty liver disease, diabetes mellitus type 2    Musculoskeletal     Abdominal    Substance History - negative use     OB/GYN negative ob/gyn ROS         Other   arthritis,                   Anesthesia Plan    ASA 3     MAC     (Pt told that intravenous sedation will be used as the primary anesthetic along with local anesthesia if necessary. Every effort will be made to make sure the patient is comfortable.     The patient was told they may or may not have recall for the procedure. It was further explained that if the MAC was not adequate that a general anesthetic with either an LMA or endotracheal tube would be required.     Will proceed with the plan of care.)  intravenous induction     Anesthetic plan, risks, benefits, and alternatives have been provided, discussed and informed consent has been obtained with: patient.    CODE STATUS:

## 2023-09-25 NOTE — DISCHARGE INSTRUCTIONS
- Discharge patient to home (ambulatory).   - High fiber diet.   - Continue present medications.   - Await pathology results.   - Repeat colonoscopy in 3 years for surveillance.   - Return to GI office in 8 weeks  No pushing, pulling, tugging,  heavy lifting, or strenuous activity.  No major decision making, driving, or drinking alcoholic beverages for 24 hours. ( due to the medications you have  received)  Always use good hand hygiene/washing techniques.  NO driving while taking pain medications.    To assist you in voiding:  Drink plenty of fluids  Listen to running water while attempting to void.    If you are unable to urinate and you have an uncomfortable urge to void or it has been   6 hours since you were discharged, return to the Emergency Room

## 2023-09-25 NOTE — H&P
[facility]  HISTORY AND PHYSICAL    Patient Name: Peter Ghotra  : 1955  MRN: [id number]    Chief Complaint:   For screening  colonoscopy    History Of Presenting Illness:    Average risk screening    Past Medical History:   Diagnosis Date    Anxiety     Arthritis     Asthma     Cataract     COPD (chronic obstructive pulmonary disease)     Diabetes mellitus     Low back pain 2017    Visual impairment        Past Surgical History:   Procedure Laterality Date    CATARACT EXTRACTION W/ INTRAOCULAR LENS IMPLANT Right 2023    Procedure: CATARACT PHACO EXTRACTION WITH INTRAOCULAR LENS IMPLANT RIGHT;  Surgeon: Alesha Ennis MD;  Location: [facility];  Service: Ophthalmology;  Laterality: Right;       Social History     Socioeconomic History    Marital status: Single   Tobacco Use    Smoking status: Every Day     Packs/day: 1.00     Years: 15.00     Pack years: 15.00     Types: Cigarettes    Smokeless tobacco: Never   Vaping Use    Vaping Use: Never used   Substance and Sexual Activity    Alcohol use: Yes     Alcohol/week: 2.0 standard drinks     Types: 2 Cans of beer per week    Drug use: Never    Sexual activity: Not Currently     Partners: Female       Family History   Problem Relation Age of Onset    Arthritis Father        Prior to Admission Medications:  Medications Prior to Admission   Medication Sig Dispense Refill Last Dose    albuterol (ProAir RespiClick) 108 (90 Base) MCG/ACT inhaler INHALE 1 PUFF BY MOUTH EVERY 6 HOURS AS NEEDED FOR SHORTNESS OF BREATH 1 each 1 2023 at 1800    Breo Ellipta 100-25 MCG/ACT aerosol powder  Inhale 1 puff Daily. 28 each 3 2023 at 0800    metFORMIN ER (GLUCOPHAGE-XR) 500 MG 24 hr tablet Take 1 tablet by mouth Daily With Breakfast. 30 tablet 2 2023    sodium-potassium-magnesium sulfates (SUPREP) 17.5-3.13-1.6 GM/177ML solution oral solution Take 2 bottles by mouth 1 (One) Time for 1 dose. Please see prep instructions from office  354 mL 0 9/25/2023 at 0200       Allergies:  No Known Allergies     Vitals: Temp:  [97.3 °F (36.3 °C)] 97.3 °F (36.3 °C)  Heart Rate:  [68] 68  Resp:  [18] 18  BP: (126)/(79) 126/79    Review Of Systems:  Constitutional:  Negative for chills, fever, and unexpected weight change.  Respiratory:  Negative for cough, chest tightness, shortness of breath, and wheezing.  Cardiovascular:  Negative for chest pain, palpitations, and leg swelling.  Gastrointestinal:  Negative for abdominal distention, abdominal pain, nausea, vomiting.  Neurological:  Negative for weakness, numbness, and headaches.     Physical Exam:    General Appearance:  Alert, cooperative, in no acute distress.   Lungs:   Clear to auscultation, respirations regular, even and                 unlabored.   Heart:  Regular rhythm and normal rate.   Abdomen:   Normal bowel sounds, no masses, no organomegaly. Soft, nontender, nondistended   Neurologic: Alert and oriented x 3. Moves all four limbs equally       Assessment & Plan     Assessment:  Principal Problem:    Screening PSA (prostate specific antigen)      Plan: COLONOSCOPY FOR SCREENING CPT CODE:  (N/A)     Pavan Parrish MD  9/25/2023

## 2023-09-26 LAB — REF LAB TEST METHOD: NORMAL

## 2023-10-24 ENCOUNTER — HOSPITAL ENCOUNTER (EMERGENCY)
Facility: HOSPITAL | Age: 68
Discharge: HOME | End: 2023-10-24
Attending: STUDENT IN AN ORGANIZED HEALTH CARE EDUCATION/TRAINING PROGRAM | Admitting: STUDENT IN AN ORGANIZED HEALTH CARE EDUCATION/TRAINING PROGRAM
Payer: MEDICARE

## 2023-10-24 ENCOUNTER — APPOINTMENT (OUTPATIENT)
Dept: ULTRASOUND IMAGING | Facility: HOSPITAL | Age: 68
End: 2023-10-24
Payer: MEDICARE

## 2023-10-24 VITALS
HEART RATE: 61 BPM | TEMPERATURE: 97.9 F | WEIGHT: 148 LBS | SYSTOLIC BLOOD PRESSURE: 145 MMHG | RESPIRATION RATE: 15 BRPM | HEIGHT: 64 IN | DIASTOLIC BLOOD PRESSURE: 90 MMHG | BODY MASS INDEX: 25.27 KG/M2 | OXYGEN SATURATION: 92 %

## 2023-10-24 DIAGNOSIS — R10.31 RIGHT GROIN PAIN: Primary | ICD-10-CM

## 2023-10-24 LAB
BILIRUB UR QL STRIP: NEGATIVE
CLARITY UR: CLEAR
COLOR UR: YELLOW
GLUCOSE UR STRIP-MCNC: ABNORMAL MG/DL
HGB UR QL STRIP.AUTO: NEGATIVE
KETONES UR QL STRIP: NEGATIVE
LEUKOCYTE ESTERASE UR QL STRIP.AUTO: NEGATIVE
NITRITE UR QL STRIP: NEGATIVE
PH UR STRIP.AUTO: 6 [PH] (ref 5–8)
PROT UR QL STRIP: NEGATIVE
SP GR UR STRIP: 1.02 (ref 1–1.03)
UROBILINOGEN UR QL STRIP: ABNORMAL

## 2023-10-24 PROCEDURE — 81003 URINALYSIS AUTO W/O SCOPE: CPT

## 2023-10-24 PROCEDURE — 76870 US EXAM SCROTUM: CPT

## 2023-10-24 PROCEDURE — 99284 EMERGENCY DEPT VISIT MOD MDM: CPT

## 2023-10-24 RX ORDER — TRAMADOL HYDROCHLORIDE 50 MG/1
50 TABLET ORAL ONCE
Status: COMPLETED | OUTPATIENT
Start: 2023-10-24 | End: 2023-10-24

## 2023-10-24 RX ADMIN — TRAMADOL HYDROCHLORIDE 50 MG: 50 TABLET, COATED ORAL at 12:36

## 2023-10-24 NOTE — DISCHARGE INSTRUCTIONS
No evidence of hernia or testicular injury.  Most likely muscular in nature.  No evidence of blood in your urine or UTI.  Rest, use over-the-counter medications as needed to help with pain.  Follow-up with your primary provider for reevaluation return to the ER for any new or worsening symptoms.

## 2023-10-24 NOTE — ED PROVIDER NOTES
Subjective:  History of Present Illness:    Patient is a 68-year-old here today for right groin pain and right hemiscrotal pain.  Patient states that this started yesterday after he lifted a TV by himself.  He has pain to his right groin that radiates to his right testicle as well as down his right leg.  No obvious bulge that is noted.  No difficulty with urination, actually states that it relieves pain.  No obvious bleeding or injury.  No history of hernia.      Nurses Notes reviewed and agree, including vitals, allergies, social history and prior medical history.     REVIEW OF SYSTEMS: All systems reviewed and not pertinent unless noted.  Review of Systems    Past Medical History:   Diagnosis Date    Anxiety     Arthritis     Asthma     Cataract 2020    COPD (chronic obstructive pulmonary disease)     Diabetes mellitus     Low back pain 2017    Visual impairment        Allergies:    Patient has no known allergies.      Past Surgical History:   Procedure Laterality Date    CATARACT EXTRACTION W/ INTRAOCULAR LENS IMPLANT Right 03/13/2023    Procedure: CATARACT PHACO EXTRACTION WITH INTRAOCULAR LENS IMPLANT RIGHT;  Surgeon: Alesha Ennis MD;  Location: [facility] OR;  Service: Ophthalmology;  Laterality: Right;    COLONOSCOPY N/A 9/25/2023    Procedure: COLONOSCOPY with polypectomy;  Surgeon: Pavan Parrish MD;  Location: [facility] ENDOSCOPY;  Service: Gastroenterology;  Laterality: N/A;         Social History     Socioeconomic History    Marital status: Single   Tobacco Use    Smoking status: Every Day     Packs/day: 1.00     Years: 15.00     Additional pack years: 0.00     Total pack years: 15.00     Types: Cigarettes    Smokeless tobacco: Never   Vaping Use    Vaping Use: Never used   Substance and Sexual Activity    Alcohol use: Yes     Alcohol/week: 2.0 standard drinks of alcohol     Types: 2 Cans of beer per week    Drug use: Never    Sexual activity: Not Currently     Partners: Female         Family History  "  Problem Relation Age of Onset    Arthritis Father        Objective  Physical Exam:  /90   Pulse 61   Temp 97.9 °F (36.6 °C) (Oral)   Resp 15   Ht 162.6 cm (64\")   Wt 67.1 kg (148 lb)   SpO2 92%   BMI 25.40 kg/m²      Physical Exam  Vitals and nursing note reviewed.   Constitutional:       Appearance: Normal appearance. He is normal weight.   HENT:      Head: Normocephalic and atraumatic.   Cardiovascular:      Rate and Rhythm: Normal rate and regular rhythm.      Pulses: Normal pulses.      Heart sounds: Normal heart sounds.   Pulmonary:      Effort: Pulmonary effort is normal.      Breath sounds: Normal breath sounds.   Abdominal:      General: Abdomen is flat. Bowel sounds are normal. There is no distension.      Palpations: Abdomen is soft.      Tenderness: There is no abdominal tenderness.      Hernia: There is no hernia in the right inguinal area.   Genitourinary:     Testes:         Right: Tenderness present. Swelling not present. Cremasteric reflex is present.    Musculoskeletal:      Right lower leg: No edema.      Left lower leg: No edema.   Lymphadenopathy:      Lower Body: No right inguinal adenopathy.   Skin:     General: Skin is warm and dry.      Capillary Refill: Capillary refill takes less than 2 seconds.   Neurological:      General: No focal deficit present.      Mental Status: He is alert and oriented to person, place, and time.   Psychiatric:         Mood and Affect: Mood normal.         Behavior: Behavior normal.         Procedures    ED Course:         Lab Results (last 24 hours)       ** No results found for the last 24 hours. **             US Scrotum & Testicles    Result Date: 10/24/2023  PROCEDURE: US SCROTUM AND TESTICLES-  HISTORY: Right sided testicular pain  PROCEDURE: Ultrasound images of the testicles were obtained bilaterally. Color Doppler images were obtained.  FINDINGS: The testicles are normal in size bilaterally. There are a few small echogenic foci bilaterally " consistent with microlithiasis. A 1 year follow-up exam is recommended. Arterial flow is identified bilaterally. No intratesticular masses are identified. There is a small left hydrocele. There is a left varicocele. Lymph node seen in the right inguinal region are normal in size.      Impression: 1. No evidence of testicular mass or torsion. 2. A few small echogenic foci bilaterally are consistent with microlithiasis. A 1 year follow-up exam is recommended. 3. Left varicocele with small left hydrocele.      Images were reviewed, interpreted, and dictated by Dr. Ivone Anderson MD Transcribed by Charisse Zeng PA-C.  This report was signed and finalized on 10/24/2023 1:19 PM by Ivone Anderson MD.          MDM     Amount and/or Complexity of Data Reviewed  Clinical lab tests: reviewed  Tests in the radiology section of CPT®: reviewed        Initial impression of presenting illness: Patient is a 68-year-old male here today with right groin pain and testicular pain.    DDX: includes but is not limited to: Testicular torsion, epididymitis, inguinal hernia, among others    Patient arrives hemodynamically stable with vitals interpreted by myself.     Pertinent features from physical exam: Right groin pain as noted above.    Initial diagnostic plan: UA and ultrasound    Results from initial plan were reviewed and interpreted by me revealing no evidence of UTI or bleeding on the urinalysis.  No evidence of testicular torsion on the ultrasound, there is mention of microlithiasis    Diagnostic information from other sources: Medical record    Interventions / Re-evaluation: Patient was given tramadol for pain control    Results/clinical rationale were discussed with attending physician and he is in agreement with the plan of care.  Patient most likely experiencing muscular pain due to the heavy lifting yesterday.  No evidence of testicular torsion, UTI, or hernia.  Patient found to be is safe for  discharge    Consultations/Discussion of results with other physicians: None    Disposition plan: Patient discharged home in stable condition  -----        Final diagnoses:   Right groin pain          Tre Cr, APRN  10/25/23 2121

## 2023-10-30 ENCOUNTER — OFFICE VISIT (OUTPATIENT)
Dept: INTERNAL MEDICINE | Facility: CLINIC | Age: 68
End: 2023-10-30
Payer: MEDICARE

## 2023-10-30 VITALS
RESPIRATION RATE: 18 BRPM | WEIGHT: 151 LBS | SYSTOLIC BLOOD PRESSURE: 142 MMHG | BODY MASS INDEX: 25.78 KG/M2 | OXYGEN SATURATION: 100 % | HEART RATE: 66 BPM | DIASTOLIC BLOOD PRESSURE: 86 MMHG | HEIGHT: 64 IN

## 2023-10-30 DIAGNOSIS — R73.9 HYPERGLYCEMIA: Primary | ICD-10-CM

## 2023-10-30 DIAGNOSIS — R91.1 LUNG NODULE: ICD-10-CM

## 2023-10-30 DIAGNOSIS — J43.1 PANLOBULAR EMPHYSEMA: ICD-10-CM

## 2023-10-30 DIAGNOSIS — F17.210 CIGARETTE NICOTINE DEPENDENCE WITHOUT COMPLICATION: ICD-10-CM

## 2023-10-30 DIAGNOSIS — K40.91 UNILATERAL RECURRENT INGUINAL HERNIA WITHOUT OBSTRUCTION OR GANGRENE: ICD-10-CM

## 2023-10-30 DIAGNOSIS — E11.65 TYPE 2 DIABETES MELLITUS WITH HYPERGLYCEMIA, WITHOUT LONG-TERM CURRENT USE OF INSULIN: ICD-10-CM

## 2023-10-30 DIAGNOSIS — Z12.5 PROSTATE CANCER SCREENING: ICD-10-CM

## 2023-10-30 DIAGNOSIS — N50.811 TESTICULAR PAIN, RIGHT: ICD-10-CM

## 2023-10-30 DIAGNOSIS — E55.9 VITAMIN D DEFICIENCY, UNSPECIFIED: ICD-10-CM

## 2023-10-30 PROBLEM — R35.1 NOCTURIA: Status: RESOLVED | Noted: 2022-12-27 | Resolved: 2023-10-30

## 2023-10-30 LAB
EXPIRATION DATE: ABNORMAL
HBA1C MFR BLD: 7.8 % (ref 4.5–5.7)
Lab: ABNORMAL

## 2023-10-30 PROCEDURE — 83036 HEMOGLOBIN GLYCOSYLATED A1C: CPT | Performed by: STUDENT IN AN ORGANIZED HEALTH CARE EDUCATION/TRAINING PROGRAM

## 2023-10-30 PROCEDURE — 99214 OFFICE O/P EST MOD 30 MIN: CPT | Performed by: STUDENT IN AN ORGANIZED HEALTH CARE EDUCATION/TRAINING PROGRAM

## 2023-10-30 PROCEDURE — 3051F HG A1C>EQUAL 7.0%<8.0%: CPT | Performed by: STUDENT IN AN ORGANIZED HEALTH CARE EDUCATION/TRAINING PROGRAM

## 2023-10-30 RX ORDER — METFORMIN HYDROCHLORIDE 500 MG/1
500 TABLET, EXTENDED RELEASE ORAL
Qty: 30 TABLET | Refills: 2 | Status: SHIPPED | OUTPATIENT
Start: 2023-10-30 | End: 2023-10-30

## 2023-10-30 RX ORDER — ALBUTEROL SULFATE 90 UG/1
2 POWDER, METERED RESPIRATORY (INHALATION) EVERY 4 HOURS PRN
Qty: 2 EACH | Refills: 5 | Status: SHIPPED | OUTPATIENT
Start: 2023-10-30

## 2023-10-30 RX ORDER — NAPROXEN 500 MG/1
500 TABLET ORAL 2 TIMES DAILY WITH MEALS
Qty: 90 TABLET | Refills: 1 | Status: SHIPPED | OUTPATIENT
Start: 2023-10-30

## 2023-10-30 RX ORDER — FLUTICASONE FUROATE AND VILANTEROL TRIFENATATE 100; 25 UG/1; UG/1
1 POWDER RESPIRATORY (INHALATION) DAILY
Qty: 28 EACH | Refills: 3 | Status: SHIPPED | OUTPATIENT
Start: 2023-10-30 | End: 2023-11-01

## 2023-10-30 NOTE — PROGRESS NOTES
Office Note     Name: Peter Ghotra    : 1955     MRN: [id number]     Chief Complaint  Follow-up (3m f/u); Vitamin D deficiency, unspecified; and Groin Pain    Subjective     History of Present Illness:  Peter Ghotra is a 68 y.o. male who presents today for chronic conditions.    Dm type 2: last A1c was 2023 was 7.4, not on meds  High blood pressure today: not on meds, does not check at home, denies symptoms  Right testicular pain: right scrotum sore for about 1 week now. No redness, swelling; pain radiates to right inner thigh. US testicle and scrotum no mass or torsion, left varicocele and hydrocoele  COPD: no increase in work of breathing, compliant with breo and prn albuerol    Current smoker: 50 years x 1/2 pack per day  LDCT: last was 2023 lung nodules, repeat on 3/2024    Family History:   Family History   Problem Relation Age of Onset    Arthritis Father        Social History:   Social History     Socioeconomic History    Marital status: Single   Tobacco Use    Smoking status: Every Day     Packs/day: 1.00     Years: 15.00     Additional pack years: 0.00     Total pack years: 15.00     Types: Cigarettes    Smokeless tobacco: Never   Vaping Use    Vaping Use: Never used   Substance and Sexual Activity    Alcohol use: Yes     Alcohol/week: 2.0 standard drinks of alcohol     Types: 2 Cans of beer per week    Drug use: Never    Sexual activity: Not Currently     Partners: Female       Health Maintenance   Topic Date Due    Hepatitis B (1 of 3 - Risk 3-dose series) Never done    ZOSTER VACCINE (1 of 2) 2025 (Originally 2005)    ANNUAL WELLNESS VISIT  2024    BMI FOLLOWUP  2024    TDAP/TD VACCINES (2 - Td or Tdap) 2027    COLORECTAL CANCER SCREENING  2033    HEPATITIS C SCREENING  Completed    COVID-19 Vaccine  Completed    INFLUENZA VACCINE  Completed    Pneumococcal Vaccine 65+  Completed    AAA SCREEN (ONE-TIME)  Completed       Objective     Vital  "Signs  /86 (BP Location: Left arm, Patient Position: Sitting, Cuff Size: Adult)   Pulse 66   Resp 18   Ht 162.6 cm (64\")   Wt 68.5 kg (151 lb)   SpO2 100%   BMI 25.92 kg/m²   Estimated body mass index is 25.92 kg/m² as calculated from the following:    Height as of this encounter: 162.6 cm (64\").    Weight as of this encounter: 68.5 kg (151 lb).        Physical Exam  Vitals and nursing note reviewed.   Constitutional:       Appearance: Normal appearance.   HENT:      Head: Normocephalic and atraumatic.   Cardiovascular:      Rate and Rhythm: Normal rate and regular rhythm.      Pulses: Normal pulses.      Heart sounds: Normal heart sounds.   Pulmonary:      Effort: Pulmonary effort is normal. No respiratory distress.      Breath sounds: Wheezing and rhonchi present. No rales.   Abdominal:      General: Abdomen is flat. Bowel sounds are normal.      Palpations: Abdomen is soft.      Tenderness: There is no abdominal tenderness. There is no guarding.   Genitourinary:     Penis: Normal.       Testes: Normal.      Comments: Right inguinal tenderness; no scrotal swelling or redness  Musculoskeletal:      Cervical back: Neck supple.   Skin:     General: Skin is warm.      Capillary Refill: Capillary refill takes less than 2 seconds.   Neurological:      General: No focal deficit present.      Mental Status: He is alert. Mental status is at baseline.   Psychiatric:         Mood and Affect: Mood normal.         Behavior: Behavior normal.          Procedures     Assessment and Plan     Diagnoses and all orders for this visit:    1. Hyperglycemia (Primary)  Assessment & Plan:  A1c went up to 7.8 today  Increase Metformin to 1000mg BID  Repeat A1c with labs prior to next visit    Orders:  -     CBC & Differential  -     Comprehensive Metabolic Panel  -     Lipid Panel  -     TSH Rfx On Abnormal To Free T4  -     Hemoglobin A1c    2. Panlobular emphysema  Assessment & Plan:  Daily breo and prn albuterol  Stable on " current medication and dosage. Will continue current management. Refill medication as necessary.          Orders:  -     albuterol (ProAir RespiClick) 108 (90 Base) MCG/ACT inhaler; Inhale 2 puffs Every 4 (Four) Hours As Needed for Wheezing.  Dispense: 2 each; Refill: 5  -     Breo Ellipta 100-25 MCG/ACT aerosol powder ; Inhale 1 puff Daily.  Dispense: 28 each; Refill: 3    3. Type 2 diabetes mellitus with hyperglycemia, without long-term current use of insulin  Assessment & Plan:  A1c went up to 7.8 today  Increase Metformin to 1000mg BID  Repeat A1c with labs prior to next visit    Orders:  -     Discontinue: metFORMIN ER (GLUCOPHAGE-XR) 500 MG 24 hr tablet; Take 1 tablet by mouth Daily With Breakfast.  Dispense: 30 tablet; Refill: 2  -     CBC & Differential  -     Comprehensive Metabolic Panel  -     Lipid Panel  -     TSH Rfx On Abnormal To Free T4    4. Vitamin D deficiency, unspecified  Assessment & Plan:  may take OTC vitamin D  Will monitor    Orders:  -     Vitamin D,25-Hydroxy    5. Lung nodule  Assessment & Plan:  Stable, repeat LDCT on 3/2024, ordered      6. Cigarette nicotine dependence without complication  -      CT Chest Low Dose Cancer Screening WO; Future    7. Testicular pain, right  Assessment & Plan:  Right inguinal pain, left varicocele and hydrocele  Refer to urology    Orders:  -     Ambulatory Referral to Urology    8. Unilateral recurrent inguinal hernia without obstruction or gangrene  Assessment & Plan:  Not incarcerated or strangulated, refer to surgery     Orders:  -     Ambulatory Referral to Urology  -     Ambulatory Referral to General Surgery    9. Prostate cancer screening  -     PSA Screen    Other orders  -     metFORMIN (GLUCOPHAGE) 1000 MG tablet; Take 1 tablet by mouth 2 (Two) Times a Day With Meals.  Dispense: 180 tablet; Refill: 1  -     naproxen (Naprosyn) 500 MG tablet; Take 1 tablet by mouth 2 (Two) Times a Day With Meals.  Dispense: 90 tablet; Refill: 1          Counseling was given to patient for the following topics: instructions for management, risks and benefits of treatment options, and importance of treatment compliance.    Follow Up  Return in about 3 months (around 2/10/2024) for Medicare Wellness.    MD UGO Heller [facility]  [address] [place] [id number] [phone]

## 2023-10-30 NOTE — H&P (VIEW-ONLY)
Patient: Peter Ghotra    YOB: 1955    Date: 10/31/2023    Primary Care Provider: Arely Garrett MD    Chief Complaint   Patient presents with    Hernia       SUBJECTIVE:    History of present illness: Patient with a 2-week history of pain in the right groin.  Walking makes it worse and laying down makes it better.  He feels no masses.    The following portions of the patient's history were reviewed and updated as appropriate: allergies, current medications, past family history, past medical history, past social history, past surgical history and problem list.      Review of Systems   Constitutional:  Negative for activity change, chills, fever and unexpected weight change.   HENT:  Negative for hearing loss, trouble swallowing and voice change.    Eyes:  Negative for visual disturbance.   Respiratory:  Negative for apnea, cough, chest tightness, shortness of breath and wheezing.    Cardiovascular:  Negative for chest pain, palpitations and leg swelling.   Gastrointestinal:  Negative for abdominal distention, abdominal pain, anal bleeding, blood in stool, constipation, diarrhea, nausea, rectal pain and vomiting.   Endocrine: Negative for cold intolerance and heat intolerance.   Genitourinary:  Negative for difficulty urinating, dysuria and flank pain.   Musculoskeletal:  Negative for back pain and gait problem.   Skin:  Negative for color change, rash and wound.   Neurological:  Negative for dizziness, syncope, speech difficulty, weakness, light-headedness, numbness and headaches.   Hematological:  Negative for adenopathy. Does not bruise/bleed easily.   Psychiatric/Behavioral:  Negative for confusion. The patient is not nervous/anxious.        Allergies:  No Known Allergies    Medications:    Current Outpatient Medications:     albuterol (ProAir RespiClick) 108 (90 Base) MCG/ACT inhaler, Inhale 2 puffs Every 4 (Four) Hours As Needed for Wheezing., Disp: 2 each, Rfl: 5    Breo Ellipta  "100-25 MCG/ACT aerosol powder , Inhale 1 puff Daily., Disp: 28 each, Rfl: 3    metFORMIN (GLUCOPHAGE) 1000 MG tablet, Take 1 tablet by mouth 2 (Two) Times a Day With Meals., Disp: 180 tablet, Rfl: 1    naproxen (Naprosyn) 500 MG tablet, Take 1 tablet by mouth 2 (Two) Times a Day With Meals., Disp: 90 tablet, Rfl: 1    History:  Past Medical History:   Diagnosis Date    Anxiety     Arthritis     Asthma     Cataract 2020    COPD (chronic obstructive pulmonary disease)     Diabetes mellitus     Low back pain 2017    Visual impairment        Past Surgical History:   Procedure Laterality Date    CATARACT EXTRACTION W/ INTRAOCULAR LENS IMPLANT Right 03/13/2023    Procedure: CATARACT PHACO EXTRACTION WITH INTRAOCULAR LENS IMPLANT RIGHT;  Surgeon: Alesha Ennis MD;  Location: [facility] OR;  Service: Ophthalmology;  Laterality: Right;    COLONOSCOPY N/A 9/25/2023    Procedure: COLONOSCOPY with polypectomy;  Surgeon: Pavan Parrish MD;  Location: [facility] ENDOSCOPY;  Service: Gastroenterology;  Laterality: N/A;       Family History   Problem Relation Age of Onset    Arthritis Father        Social History     Tobacco Use    Smoking status: Every Day     Packs/day: 1.00     Years: 15.00     Additional pack years: 0.00     Total pack years: 15.00     Types: Cigarettes    Smokeless tobacco: Never   Vaping Use    Vaping Use: Never used   Substance Use Topics    Alcohol use: Yes     Alcohol/week: 2.0 standard drinks of alcohol     Types: 2 Cans of beer per week    Drug use: Never        OBJECTIVE:    Vital Signs:   Vitals:    10/31/23 0942   BP: 130/70   Pulse: 71   Temp: 98.4 °F (36.9 °C)   SpO2: 99%   Weight: 69.9 kg (154 lb 3.2 oz)   Height: 162.6 cm (64.02\")       Physical Exam:   General Appearance:    Alert, cooperative, in no acute distress   Head:    Normocephalic, without obvious abnormality, atraumatic   Eyes:            Normal.  No scleral icterus.  PERRLA    Lungs:     Clear to auscultation,respirations regular, " even and                  unlabored    Heart:    Regular rhythm and normal rate, normal S1 and S2, no            murmur   Abdomen:   Soft and nondistended.  No abdominal tenderness.  A palpable small femoral hernia is appreciated consistent with ultrasound findings and there is tenderness in that location as well.   Extremities:   Moves all extremities well, no edema, no cyanosis, no             redness   Skin:   No bleeding, bruising or rash   Neurologic:   Normal without gross deficits.   Psychiatric: No evidence of depression or anxiety        Results Review:   I reviewed the patient's new clinical results.  Ultrasound was reviewed    Review of Systems was reviewed and confirmed as accurate as documented by the MA.    ASSESSMENT/PLAN:    1. Right groin pain    2. Femoral hernia of right side        Patient with a femoral hernia on the right side.  Explained the diagnosis to him.  I recommend repair of the hernia especially since it is symptomatic and he understands the risks of observation.  With repair he understands the repair process including mesh implantation.  He also understands the risks of bleeding, infection, recurrence, pain, mesh issues including pain and infection etc.  He wishes to proceed        Electronically signed by Aubrey Borrego MD  10/31/23

## 2023-10-30 NOTE — ASSESSMENT & PLAN NOTE
A1c went up to 7.8 today  Increase Metformin to 1000mg BID  Repeat A1c with labs prior to next visit

## 2023-10-30 NOTE — PROGRESS NOTES
Patient: Peter Ghotra    YOB: 1955    Date: 10/31/2023    Primary Care Provider: Arely Garrett MD    Chief Complaint   Patient presents with    Hernia       SUBJECTIVE:    History of present illness: Patient with a 2-week history of pain in the right groin.  Walking makes it worse and laying down makes it better.  He feels no masses.    The following portions of the patient's history were reviewed and updated as appropriate: allergies, current medications, past family history, past medical history, past social history, past surgical history and problem list.      Review of Systems   Constitutional:  Negative for activity change, chills, fever and unexpected weight change.   HENT:  Negative for hearing loss, trouble swallowing and voice change.    Eyes:  Negative for visual disturbance.   Respiratory:  Negative for apnea, cough, chest tightness, shortness of breath and wheezing.    Cardiovascular:  Negative for chest pain, palpitations and leg swelling.   Gastrointestinal:  Negative for abdominal distention, abdominal pain, anal bleeding, blood in stool, constipation, diarrhea, nausea, rectal pain and vomiting.   Endocrine: Negative for cold intolerance and heat intolerance.   Genitourinary:  Negative for difficulty urinating, dysuria and flank pain.   Musculoskeletal:  Negative for back pain and gait problem.   Skin:  Negative for color change, rash and wound.   Neurological:  Negative for dizziness, syncope, speech difficulty, weakness, light-headedness, numbness and headaches.   Hematological:  Negative for adenopathy. Does not bruise/bleed easily.   Psychiatric/Behavioral:  Negative for confusion. The patient is not nervous/anxious.        Allergies:  No Known Allergies    Medications:    Current Outpatient Medications:     albuterol (ProAir RespiClick) 108 (90 Base) MCG/ACT inhaler, Inhale 2 puffs Every 4 (Four) Hours As Needed for Wheezing., Disp: 2 each, Rfl: 5    Breo Ellipta  "100-25 MCG/ACT aerosol powder , Inhale 1 puff Daily., Disp: 28 each, Rfl: 3    metFORMIN (GLUCOPHAGE) 1000 MG tablet, Take 1 tablet by mouth 2 (Two) Times a Day With Meals., Disp: 180 tablet, Rfl: 1    naproxen (Naprosyn) 500 MG tablet, Take 1 tablet by mouth 2 (Two) Times a Day With Meals., Disp: 90 tablet, Rfl: 1    History:  Past Medical History:   Diagnosis Date    Anxiety     Arthritis     Asthma     Cataract 2020    COPD (chronic obstructive pulmonary disease)     Diabetes mellitus     Low back pain 2017    Visual impairment        Past Surgical History:   Procedure Laterality Date    CATARACT EXTRACTION W/ INTRAOCULAR LENS IMPLANT Right 03/13/2023    Procedure: CATARACT PHACO EXTRACTION WITH INTRAOCULAR LENS IMPLANT RIGHT;  Surgeon: Alesha Ennis MD;  Location: [facility] OR;  Service: Ophthalmology;  Laterality: Right;    COLONOSCOPY N/A 9/25/2023    Procedure: COLONOSCOPY with polypectomy;  Surgeon: Pavan Parrish MD;  Location: [facility] ENDOSCOPY;  Service: Gastroenterology;  Laterality: N/A;       Family History   Problem Relation Age of Onset    Arthritis Father        Social History     Tobacco Use    Smoking status: Every Day     Packs/day: 1.00     Years: 15.00     Additional pack years: 0.00     Total pack years: 15.00     Types: Cigarettes    Smokeless tobacco: Never   Vaping Use    Vaping Use: Never used   Substance Use Topics    Alcohol use: Yes     Alcohol/week: 2.0 standard drinks of alcohol     Types: 2 Cans of beer per week    Drug use: Never        OBJECTIVE:    Vital Signs:   Vitals:    10/31/23 0942   BP: 130/70   Pulse: 71   Temp: 98.4 °F (36.9 °C)   SpO2: 99%   Weight: 69.9 kg (154 lb 3.2 oz)   Height: 162.6 cm (64.02\")       Physical Exam:   General Appearance:    Alert, cooperative, in no acute distress   Head:    Normocephalic, without obvious abnormality, atraumatic   Eyes:            Normal.  No scleral icterus.  PERRLA    Lungs:     Clear to auscultation,respirations regular, " even and                  unlabored    Heart:    Regular rhythm and normal rate, normal S1 and S2, no            murmur   Abdomen:   Soft and nondistended.  No abdominal tenderness.  A palpable small femoral hernia is appreciated consistent with ultrasound findings and there is tenderness in that location as well.   Extremities:   Moves all extremities well, no edema, no cyanosis, no             redness   Skin:   No bleeding, bruising or rash   Neurologic:   Normal without gross deficits.   Psychiatric: No evidence of depression or anxiety        Results Review:   I reviewed the patient's new clinical results.  Ultrasound was reviewed    Review of Systems was reviewed and confirmed as accurate as documented by the MA.    ASSESSMENT/PLAN:    1. Right groin pain    2. Femoral hernia of right side        Patient with a femoral hernia on the right side.  Explained the diagnosis to him.  I recommend repair of the hernia especially since it is symptomatic and he understands the risks of observation.  With repair he understands the repair process including mesh implantation.  He also understands the risks of bleeding, infection, recurrence, pain, mesh issues including pain and infection etc.  He wishes to proceed        Electronically signed by Aubrey Borrego MD  10/31/23

## 2023-10-30 NOTE — ASSESSMENT & PLAN NOTE
Daily breo and prn albuterol  Stable on current medication and dosage. Will continue current management. Refill medication as necessary.

## 2023-10-31 ENCOUNTER — OFFICE VISIT (OUTPATIENT)
Dept: SURGERY | Facility: CLINIC | Age: 68
End: 2023-10-31
Payer: MEDICARE

## 2023-10-31 ENCOUNTER — PATIENT ROUNDING (BHMG ONLY) (OUTPATIENT)
Dept: SURGERY | Facility: CLINIC | Age: 68
End: 2023-10-31
Payer: MEDICAID

## 2023-10-31 VITALS
OXYGEN SATURATION: 99 % | WEIGHT: 154.2 LBS | SYSTOLIC BLOOD PRESSURE: 130 MMHG | HEIGHT: 64 IN | TEMPERATURE: 98.4 F | BODY MASS INDEX: 26.32 KG/M2 | DIASTOLIC BLOOD PRESSURE: 70 MMHG | HEART RATE: 71 BPM

## 2023-10-31 DIAGNOSIS — J44.9 CHRONIC OBSTRUCTIVE PULMONARY DISEASE, UNSPECIFIED COPD TYPE: ICD-10-CM

## 2023-10-31 DIAGNOSIS — K41.90 FEMORAL HERNIA OF RIGHT SIDE: ICD-10-CM

## 2023-10-31 DIAGNOSIS — R10.31 RIGHT GROIN PAIN: Primary | ICD-10-CM

## 2023-10-31 PROCEDURE — 1159F MED LIST DOCD IN RCRD: CPT | Performed by: SURGERY

## 2023-10-31 PROCEDURE — 1160F RVW MEDS BY RX/DR IN RCRD: CPT | Performed by: SURGERY

## 2023-10-31 PROCEDURE — 99204 OFFICE O/P NEW MOD 45 MIN: CPT | Performed by: SURGERY

## 2023-10-31 NOTE — PROGRESS NOTES
October 31, 2023    Hello, may I speak with Peter Ghotra?    My name is Aura      I am  with [facility]  [address]  [facility] [address] [phone].    Before we get started may I verify your date of birth? 1955    I am calling to officially welcome you to our practice and ask about your recent visit. Is this a good time to talk? yes    Tell me about your visit with us. What things went well?  Good Visit       We're always looking for ways to make our patients' experiences even better. Do you have recommendations on ways we may improve?  no    Overall were you satisfied with your first visit to our practice? yes       I appreciate you taking the time to speak with me today. Is there anything else I can do for you? no      Thank you, and have a great day.

## 2023-11-01 DIAGNOSIS — J43.1 PANLOBULAR EMPHYSEMA: ICD-10-CM

## 2023-11-01 RX ORDER — FLUTICASONE FUROATE AND VILANTEROL TRIFENATATE 100; 25 UG/1; UG/1
1 POWDER RESPIRATORY (INHALATION) DAILY
Qty: 60 EACH | Refills: 3 | Status: SHIPPED | OUTPATIENT
Start: 2023-11-01

## 2023-11-02 ENCOUNTER — HOSPITAL ENCOUNTER (OUTPATIENT)
Dept: GENERAL RADIOLOGY | Facility: HOSPITAL | Age: 68
Discharge: HOME | End: 2023-11-02
Payer: MEDICAID

## 2023-11-02 ENCOUNTER — PRE-ADMISSION TESTING (OUTPATIENT)
Dept: PREADMISSION TESTING | Facility: HOSPITAL | Age: 68
End: 2023-11-02
Payer: MEDICAID

## 2023-11-02 VITALS — WEIGHT: 147.8 LBS | BODY MASS INDEX: 25.36 KG/M2

## 2023-11-02 DIAGNOSIS — J44.9 CHRONIC OBSTRUCTIVE PULMONARY DISEASE, UNSPECIFIED COPD TYPE: ICD-10-CM

## 2023-11-02 DIAGNOSIS — K41.90 FEMORAL HERNIA OF RIGHT SIDE: ICD-10-CM

## 2023-11-02 LAB
ANION GAP SERPL CALCULATED.3IONS-SCNC: 9.3 MMOL/L (ref 5–15)
BUN SERPL-MCNC: 23 MG/DL (ref 8–23)
BUN/CREAT SERPL: 28 (ref 7–25)
CALCIUM SPEC-SCNC: 9.4 MG/DL (ref 8.6–10.5)
CHLORIDE SERPL-SCNC: 101 MMOL/L (ref 98–107)
CO2 SERPL-SCNC: 26.7 MMOL/L (ref 22–29)
CREAT SERPL-MCNC: 0.82 MG/DL (ref 0.76–1.27)
DEPRECATED RDW RBC AUTO: 40.7 FL (ref 37–54)
EGFRCR SERPLBLD CKD-EPI 2021: 95.7 ML/MIN/1.73
ERYTHROCYTE [DISTWIDTH] IN BLOOD BY AUTOMATED COUNT: 11.7 % (ref 12.3–15.4)
GLUCOSE SERPL-MCNC: 114 MG/DL (ref 65–99)
HCT VFR BLD AUTO: 49.1 % (ref 37.5–51)
HGB BLD-MCNC: 17.1 G/DL (ref 13–17.7)
MCH RBC QN AUTO: 33.3 PG (ref 26.6–33)
MCHC RBC AUTO-ENTMCNC: 34.8 G/DL (ref 31.5–35.7)
MCV RBC AUTO: 95.5 FL (ref 79–97)
PLATELET # BLD AUTO: 186 10*3/MM3 (ref 140–450)
PMV BLD AUTO: 10.4 FL (ref 6–12)
POTASSIUM SERPL-SCNC: 4.6 MMOL/L (ref 3.5–5.2)
RBC # BLD AUTO: 5.14 10*6/MM3 (ref 4.14–5.8)
SODIUM SERPL-SCNC: 137 MMOL/L (ref 136–145)
WBC NRBC COR # BLD: 12.09 10*3/MM3 (ref 3.4–10.8)

## 2023-11-02 PROCEDURE — 71046 X-RAY EXAM CHEST 2 VIEWS: CPT

## 2023-11-02 PROCEDURE — 93005 ELECTROCARDIOGRAM TRACING: CPT

## 2023-11-02 PROCEDURE — 93010 ELECTROCARDIOGRAM REPORT: CPT | Performed by: INTERNAL MEDICINE

## 2023-11-02 PROCEDURE — 36415 COLL VENOUS BLD VENIPUNCTURE: CPT

## 2023-11-02 PROCEDURE — 80048 BASIC METABOLIC PNL TOTAL CA: CPT

## 2023-11-02 PROCEDURE — 85027 COMPLETE CBC AUTOMATED: CPT

## 2023-11-02 NOTE — DISCHARGE INSTRUCTIONS
Introduction to anesthesia video viewed by pt in PAT.      PAT PASS GIVEN/REVIEWED WITH PT.  VERBALIZED UNDERSTANDING OF THE FOLLOWING:  DO NOT EAT, DRINK, SMOKE, USE SMOKELESS TOBACCO OR CHEW GUM AFTER MIDNIGHT THE NIGHT BEFORE SURGERY.  THIS ALSO INCLUDES HARD CANDIES AND MINTS.    DO NOT SHAVE THE AREA TO BE OPERATED ON AT LEAST 48 HOURS PRIOR TO THE PROCEDURE.  DO NOT WEAR MAKE UP OR NAIL POLISH.  DO NOT LEAVE IN ANY PIERCING OR WEAR JEWELRY THE DAY OF SURGERY.      DO NOT USE ADHESIVES IF YOU WEAR DENTURES.    DO NOT WEAR EYE CONTACTS; BRING IN YOUR GLASSES.    ONLY TAKE MEDICATION THE MORNING OF YOUR PROCEDURE IF INSTRUCTED BY YOUR SURGEON WITH ENOUGH WATER TO SWALLOW THE MEDICATION.  IF YOUR SURGEON DID NOT SPECIFY WHICH MEDICATIONS TO TAKE, YOU WILL NEED TO CALL THEIR OFFICE FOR FURTHER INSTRUCTIONS AND DO AS THEY INSTRUCT.    LEAVE ANYTHING YOU CONSIDER VALUABLE AT HOME.    YOU WILL NEED TO ARRANGE FOR SOMEONE TO DRIVE YOU HOME AFTER SURGERY.  IT IS RECOMMENDED THAT YOU DO NOT DRIVE, WORK, DRINK ALCOHOL OR MAKE MAJOR DECISIONS FOR AT LEAST 24 HOURS AFTER YOUR PROCEDURE IS COMPLETE.      THE DAY OF YOUR PROCEDURE, BRING IN THE FOLLOWING IF APPLICABLE:   PICTURE ID AND INSURANCE/MEDICARE OR MEDICAID CARDS/ANY CO-PAY THAT MAY BE DUE   COPY OF ADVANCED DIRECTIVE/LIVING WILL/POWER OR    CPAP/BIPAP/INHALERS   SKIN PREP SHEET   YOUR PREADMISSION TESTING PASS (IF NOT A PHONE HISTORY)     Chlorhexidine wipes along with instruction/verification sheet given to pt.  Instructed pt to date, time, and initial the verification sheet once skin prep has been  completed, and to [place] [facility] the day of the procedure.  Pt. Verbalizes understanding.      Medication instructions given to pt by RN per anesthesia protocol.  Pt referred back to surgeon for further instructions if he/she is on any blood thinners.

## 2023-11-02 NOTE — NURSING NOTE
"Placed call to Juventino Cespedes CRNA regarding 67 y/o pt in [facility] for upcoming inguinal hernia repair with Dr. Borrego on 11/8/23. ECG ordered per protocol; pt with medical hx including DM,COPD.  Unconfirmed ECG reads \"Normal sinus rhythm, Anteroseptal infarct , age undetermined.\"  Patient denies chest pain, shortness of breath, palpitations. Per Juventino, pt may proceed with surgery; no additional orders received.   "

## 2023-11-03 ENCOUNTER — TELEPHONE (OUTPATIENT)
Dept: SURGERY | Facility: CLINIC | Age: 68
End: 2023-11-03
Payer: MEDICAID

## 2023-11-03 LAB
QT INTERVAL: 360 MS
QTC INTERVAL: 396 MS

## 2023-11-06 ENCOUNTER — TELEPHONE (OUTPATIENT)
Dept: SURGERY | Facility: CLINIC | Age: 68
End: 2023-11-06
Payer: MEDICAID

## 2023-11-06 NOTE — TELEPHONE ENCOUNTER
Spoke with patient regarding abnormal EKG. He advised that he has not had any chest pain or chest discomfort.

## 2023-11-07 NOTE — TELEPHONE ENCOUNTER
Per Dr. Borrego the Pulmonary Function Test was cancelled.  I called the patient & let him know.    His Hernia surgery is still on for 11-8-23.

## 2023-11-08 ENCOUNTER — ANESTHESIA EVENT (OUTPATIENT)
Dept: PERIOP | Facility: HOSPITAL | Age: 68
End: 2023-11-08
Payer: MEDICARE

## 2023-11-08 ENCOUNTER — HOSPITAL ENCOUNTER (OUTPATIENT)
Facility: HOSPITAL | Age: 68
Setting detail: HOSPITAL OUTPATIENT SURGERY
Discharge: HOME | End: 2023-11-08
Attending: SURGERY | Admitting: SURGERY
Payer: MEDICARE

## 2023-11-08 ENCOUNTER — ANESTHESIA (OUTPATIENT)
Dept: PERIOP | Facility: HOSPITAL | Age: 68
End: 2023-11-08
Payer: MEDICARE

## 2023-11-08 VITALS
RESPIRATION RATE: 17 BRPM | HEART RATE: 86 BPM | TEMPERATURE: 97 F | SYSTOLIC BLOOD PRESSURE: 137 MMHG | DIASTOLIC BLOOD PRESSURE: 78 MMHG | OXYGEN SATURATION: 90 %

## 2023-11-08 DIAGNOSIS — K41.90 FEMORAL HERNIA OF RIGHT SIDE: ICD-10-CM

## 2023-11-08 LAB — GLUCOSE BLDC GLUCOMTR-MCNC: 157 MG/DL (ref 70–130)

## 2023-11-08 PROCEDURE — 25010000002 CEFAZOLIN SODIUM-DEXTROSE 2-3 GM-%(50ML) RECONSTITUTED SOLUTION: Performed by: SURGERY

## 2023-11-08 PROCEDURE — 82948 REAGENT STRIP/BLOOD GLUCOSE: CPT

## 2023-11-08 PROCEDURE — 25010000002 LIDOCAINE 1 % SOLUTION: Performed by: SURGERY

## 2023-11-08 PROCEDURE — 25010000002 HYDROMORPHONE PER 4 MG: Performed by: NURSE ANESTHETIST, CERTIFIED REGISTERED

## 2023-11-08 PROCEDURE — 25010000002 HYDROMORPHONE 1 MG/ML SOLUTION: Performed by: NURSE ANESTHETIST, CERTIFIED REGISTERED

## 2023-11-08 PROCEDURE — 25010000002 PROPOFOL 200 MG/20ML EMULSION: Performed by: NURSE ANESTHETIST, CERTIFIED REGISTERED

## 2023-11-08 PROCEDURE — C1781 MESH (IMPLANTABLE): HCPCS | Performed by: SURGERY

## 2023-11-08 PROCEDURE — 25810000003 LACTATED RINGERS PER 1000 ML: Performed by: SURGERY

## 2023-11-08 PROCEDURE — 25010000002 SUGAMMADEX 200 MG/2ML SOLUTION: Performed by: NURSE ANESTHETIST, CERTIFIED REGISTERED

## 2023-11-08 PROCEDURE — 25010000002 FENTANYL CITRATE PF 50 MCG/ML SOLUTION PREFILLED SYRINGE: Performed by: NURSE ANESTHETIST, CERTIFIED REGISTERED

## 2023-11-08 PROCEDURE — 25010000002 ONDANSETRON PER 1 MG: Performed by: NURSE ANESTHETIST, CERTIFIED REGISTERED

## 2023-11-08 PROCEDURE — 25010000002 DEXAMETHASONE PER 1 MG: Performed by: NURSE ANESTHETIST, CERTIFIED REGISTERED

## 2023-11-08 PROCEDURE — 25010000002 BUPIVACAINE (PF) 0.5 % SOLUTION: Performed by: SURGERY

## 2023-11-08 PROCEDURE — 94640 AIRWAY INHALATION TREATMENT: CPT

## 2023-11-08 DEVICE — VENTRIO ST HERNIA PATCH
Type: IMPLANTABLE DEVICE | Site: ABDOMEN | Status: FUNCTIONAL
Brand: VENTRIO ST HERNIA PATCH

## 2023-11-08 RX ORDER — ONDANSETRON 2 MG/ML
INJECTION INTRAMUSCULAR; INTRAVENOUS AS NEEDED
Status: DISCONTINUED | OUTPATIENT
Start: 2023-11-08 | End: 2023-11-08 | Stop reason: SURG

## 2023-11-08 RX ORDER — ROCURONIUM BROMIDE 50 MG/5 ML
SYRINGE (ML) INTRAVENOUS AS NEEDED
Status: DISCONTINUED | OUTPATIENT
Start: 2023-11-08 | End: 2023-11-08 | Stop reason: SURG

## 2023-11-08 RX ORDER — PROPOFOL 10 MG/ML
INJECTION, EMULSION INTRAVENOUS AS NEEDED
Status: DISCONTINUED | OUTPATIENT
Start: 2023-11-08 | End: 2023-11-08 | Stop reason: SURG

## 2023-11-08 RX ORDER — ONDANSETRON 2 MG/ML
4 INJECTION INTRAMUSCULAR; INTRAVENOUS ONCE AS NEEDED
Status: DISCONTINUED | OUTPATIENT
Start: 2023-11-08 | End: 2023-11-08 | Stop reason: HOSPADM

## 2023-11-08 RX ORDER — CEFAZOLIN SODIUM 2 G/50ML
2000 SOLUTION INTRAVENOUS ONCE
Status: COMPLETED | OUTPATIENT
Start: 2023-11-08 | End: 2023-11-08

## 2023-11-08 RX ORDER — HYDROCODONE BITARTRATE AND ACETAMINOPHEN 5; 325 MG/1; MG/1
1-2 TABLET ORAL EVERY 4 HOURS PRN
Qty: 14 TABLET | Refills: 0 | Status: SHIPPED | OUTPATIENT
Start: 2023-11-08 | End: 2023-12-01

## 2023-11-08 RX ORDER — HYDROMORPHONE HYDROCHLORIDE 2 MG/ML
INJECTION, SOLUTION INTRAMUSCULAR; INTRAVENOUS; SUBCUTANEOUS AS NEEDED
Status: DISCONTINUED | OUTPATIENT
Start: 2023-11-08 | End: 2023-11-08 | Stop reason: SURG

## 2023-11-08 RX ORDER — MEPERIDINE HYDROCHLORIDE 25 MG/ML
12.5 INJECTION INTRAMUSCULAR; INTRAVENOUS; SUBCUTANEOUS
Status: DISCONTINUED | OUTPATIENT
Start: 2023-11-08 | End: 2023-11-08 | Stop reason: HOSPADM

## 2023-11-08 RX ORDER — ONDANSETRON 4 MG/1
4 TABLET, FILM COATED ORAL EVERY 8 HOURS PRN
Qty: 8 TABLET | Refills: 0 | Status: SHIPPED | OUTPATIENT
Start: 2023-11-08 | End: 2023-11-13

## 2023-11-08 RX ORDER — BUPIVACAINE HYDROCHLORIDE 5 MG/ML
INJECTION, SOLUTION EPIDURAL; INTRACAUDAL AS NEEDED
Status: DISCONTINUED | OUTPATIENT
Start: 2023-11-08 | End: 2023-11-08 | Stop reason: HOSPADM

## 2023-11-08 RX ORDER — PROCHLORPERAZINE EDISYLATE 5 MG/ML
10 INJECTION INTRAMUSCULAR; INTRAVENOUS ONCE AS NEEDED
Status: DISCONTINUED | OUTPATIENT
Start: 2023-11-08 | End: 2023-11-08 | Stop reason: HOSPADM

## 2023-11-08 RX ORDER — LIDOCAINE HCL/PF 100 MG/5ML
SYRINGE (ML) INJECTION AS NEEDED
Status: DISCONTINUED | OUTPATIENT
Start: 2023-11-08 | End: 2023-11-08 | Stop reason: SURG

## 2023-11-08 RX ORDER — DEXAMETHASONE SODIUM PHOSPHATE 4 MG/ML
INJECTION, SOLUTION INTRA-ARTICULAR; INTRALESIONAL; INTRAMUSCULAR; INTRAVENOUS; SOFT TISSUE AS NEEDED
Status: DISCONTINUED | OUTPATIENT
Start: 2023-11-08 | End: 2023-11-08 | Stop reason: SURG

## 2023-11-08 RX ORDER — IPRATROPIUM BROMIDE AND ALBUTEROL SULFATE 2.5; .5 MG/3ML; MG/3ML
3 SOLUTION RESPIRATORY (INHALATION) ONCE
Status: COMPLETED | OUTPATIENT
Start: 2023-11-08 | End: 2023-11-08

## 2023-11-08 RX ORDER — FENTANYL CITRATE 50 UG/ML
INJECTION, SOLUTION INTRAMUSCULAR; INTRAVENOUS AS NEEDED
Status: DISCONTINUED | OUTPATIENT
Start: 2023-11-08 | End: 2023-11-08 | Stop reason: SURG

## 2023-11-08 RX ORDER — ALBUTEROL SULFATE 2.5 MG/3ML
2.5 SOLUTION RESPIRATORY (INHALATION) ONCE AS NEEDED
Status: COMPLETED | OUTPATIENT
Start: 2023-11-08 | End: 2023-11-08

## 2023-11-08 RX ORDER — SODIUM CHLORIDE, SODIUM LACTATE, POTASSIUM CHLORIDE, CALCIUM CHLORIDE 600; 310; 30; 20 MG/100ML; MG/100ML; MG/100ML; MG/100ML
1000 INJECTION, SOLUTION INTRAVENOUS CONTINUOUS
Status: DISCONTINUED | OUTPATIENT
Start: 2023-11-08 | End: 2023-11-08 | Stop reason: HOSPADM

## 2023-11-08 RX ORDER — LIDOCAINE HYDROCHLORIDE 10 MG/ML
INJECTION, SOLUTION INFILTRATION; PERINEURAL AS NEEDED
Status: DISCONTINUED | OUTPATIENT
Start: 2023-11-08 | End: 2023-11-08 | Stop reason: HOSPADM

## 2023-11-08 RX ADMIN — Medication 10 MG: at 11:38

## 2023-11-08 RX ADMIN — HYDROMORPHONE HYDROCHLORIDE 0.5 MG: 1 INJECTION, SOLUTION INTRAMUSCULAR; INTRAVENOUS; SUBCUTANEOUS at 13:26

## 2023-11-08 RX ADMIN — IPRATROPIUM BROMIDE AND ALBUTEROL SULFATE 3 ML: .5; 2.5 SOLUTION RESPIRATORY (INHALATION) at 09:32

## 2023-11-08 RX ADMIN — FENTANYL CITRATE 50 MCG: 50 INJECTION, SOLUTION INTRAMUSCULAR; INTRAVENOUS at 11:08

## 2023-11-08 RX ADMIN — Medication 40 MG: at 11:04

## 2023-11-08 RX ADMIN — CEFAZOLIN SODIUM 2000 MG: 2 SOLUTION INTRAVENOUS at 11:08

## 2023-11-08 RX ADMIN — Medication 10 MG: at 12:21

## 2023-11-08 RX ADMIN — SODIUM CHLORIDE, POTASSIUM CHLORIDE, SODIUM LACTATE AND CALCIUM CHLORIDE: 600; 310; 30; 20 INJECTION, SOLUTION INTRAVENOUS at 11:35

## 2023-11-08 RX ADMIN — SUGAMMADEX 200 MG: 100 INJECTION, SOLUTION INTRAVENOUS at 12:42

## 2023-11-08 RX ADMIN — HYDROMORPHONE HYDROCHLORIDE 0.5 MG: 1 INJECTION, SOLUTION INTRAMUSCULAR; INTRAVENOUS; SUBCUTANEOUS at 13:04

## 2023-11-08 RX ADMIN — PROPOFOL 200 MG: 10 INJECTION, EMULSION INTRAVENOUS at 11:04

## 2023-11-08 RX ADMIN — SODIUM CHLORIDE, POTASSIUM CHLORIDE, SODIUM LACTATE AND CALCIUM CHLORIDE 1000 ML: 600; 310; 30; 20 INJECTION, SOLUTION INTRAVENOUS at 09:32

## 2023-11-08 RX ADMIN — DEXAMETHASONE SODIUM PHOSPHATE 4 MG: 4 INJECTION INTRA-ARTICULAR; INTRALESIONAL; INTRAMUSCULAR; INTRAVENOUS; SOFT TISSUE at 11:24

## 2023-11-08 RX ADMIN — HYDROMORPHONE HYDROCHLORIDE 0.5 MG: 2 INJECTION, SOLUTION INTRAMUSCULAR; INTRAVENOUS; SUBCUTANEOUS at 12:32

## 2023-11-08 RX ADMIN — HYDROMORPHONE HYDROCHLORIDE 0.5 MG: 2 INJECTION, SOLUTION INTRAMUSCULAR; INTRAVENOUS; SUBCUTANEOUS at 11:36

## 2023-11-08 RX ADMIN — ONDANSETRON 4 MG: 2 INJECTION INTRAMUSCULAR; INTRAVENOUS at 11:24

## 2023-11-08 RX ADMIN — ALBUTEROL SULFATE 2.5 MG: 2.5 SOLUTION RESPIRATORY (INHALATION) at 13:30

## 2023-11-08 RX ADMIN — Medication 50 MG: at 11:04

## 2023-11-08 NOTE — ANESTHESIA PROCEDURE NOTES
Airway  Urgency: elective    Date/Time: 11/8/2023 11:05 AM  Airway not difficult    General Information and Staff    Patient location during procedure: OR  CRNA/CAA: Roxanna Motley CRNA    Indications and Patient Condition  Indications for airway management: airway protection    Preoxygenated: yes  MILS not maintained throughout  Mask difficulty assessment: 1 - vent by mask    Final Airway Details  Final airway type: endotracheal airway      Successful airway: ETT  Cuffed: yes   Successful intubation technique: direct laryngoscopy  Facilitating devices/methods: intubating stylet  Endotracheal tube insertion site: oral  Blade: Luis Enrique  Blade size: 3  ETT size (mm): 7.0  Cormack-Lehane Classification: grade I - full view of glottis  Placement verified by: chest auscultation and capnometry   Cuff volume (mL): 5  Measured from: lips  ETT/EBT  to lips (cm): 21  Number of attempts at approach: 1  Assessment: lips, teeth, and gum same as pre-op and atraumatic intubation    Additional Comments  Negative epigastric sounds, Breath sound equal bilaterally with symmetric chest rise and fall

## 2023-11-08 NOTE — OP NOTE
PATIENT:    Peter Ghotra    DATE OF SURGERY:       PHYSICIAN:    Aubrey Borrego MD    REFERRING PHYSICIAN:  Aerly Garrett MD     YOB: 1955    PREOPERATIVE DIAGNOSIS: Right inguinal/femoral hernia    POSTOPERATIVE DIAGNOSIS: right inguinal/femoral hernia    PROCEDURE:  right inguinal/femoral herniorraphy via Kugel repair    ANESTHESIA:  General Anesthesia    Specimen: None    EBL: Less than 30 mL    Complications: None    OPERATIVE PROCEDURE:  The patient was taken to the operating room, placed in the supine position, and given general endotracheal anesthesia per the Anesthesia service.  The patient was prepped and draped in the normal sterile fashion and the patient was given preoperative IV antibiotics.  An appropriate timeout was performed by the nursing staff prior to the incision.  I did meet with the patient preoperatively and marked them accordingly.    A transverse incision was made over the right inguinal canal.  This was sharply dissected down through the fascia and blunt dissection was used through the muscle.  The preperitoneal space was entered without difficulty and we carefully exposed the preperitoneal structures including the pubic tubercle, Juanito’s ligament, inguinal canal, and iliac vessels were noted.  The cord was dissected free and skeletonized.  There was no evidence of an indirect hernia sac.  There was evidence of a femoral hernia and the space medially and inferiorly to the femoral hernia was well dissected free.     A medium piece of oval Ventralex ST patch was then placed in the preperitoneal space.  It was anchored to the Juanito's ligament with a Prolene suture.  This carefully covered all aspects of the inguinal canal.  It was placed medial to the pubic tubercle and carefully covered all structures in the inguinal canal.      The mesh was tacked to the transversalis fascia with a 2-0 Vicryl suture.  A local lidocaine/Marcaine mixture was used for  postoperative wound anesthetic and then a running 0-Vicryl suture was used for fascia reapproximation.      4-0 Vicryl simple interrupted deep dermal sutures were used to close the wound and a running 4-0 pds subcuticular stitch and Steri-Strips were used to close the skin.    The patient was stable at this point in time and subsequently transferred back to the recovery room in stable condition.         Aubrey Borrego MD    11/8/2023    12:50 EST

## 2023-11-08 NOTE — ANESTHESIA POSTPROCEDURE EVALUATION
Patient: Peter Ghotra    Procedure Summary       Date: 11/08/23 Room / Location: [facility] OR 3 /  [facility]    Anesthesia Start: 1052 Anesthesia Stop: 1252    Procedure: INGUINAL HERNIA REPAIR/femoral hernia repair (Right: Abdomen) Diagnosis:       Femoral hernia of right side      (Femoral hernia of right side [K41.90])    Surgeons: Aubrey Borrego MD Provider: Roxanna Motley CRNA    Anesthesia Type: general ASA Status: 2            Anesthesia Type: general    Vitals  Vitals Value Taken Time   /65 11/08/23 1400   Temp 97.8 °F (36.6 °C) 11/08/23 1254   Pulse 78 11/08/23 1405   Resp 16 11/08/23 1315   SpO2 91 % 11/08/23 1405   Vitals shown include unfiled device data.        Post Anesthesia Care and Evaluation    Patient location during evaluation: PHASE II  Patient participation: complete - patient participated  Level of consciousness: awake and alert  Pain score: 2  Pain management: satisfactory to patient    Airway patency: patent  Anesthetic complications: No anesthetic complications  PONV Status: none  Cardiovascular status: acceptable and stable  Respiratory status: acceptable (Pt has history of smoking and COPD, Respiratory status at baseline)  Hydration status: acceptable    Comments: Vitals signs as noted in nursing documentation as per protocol.

## 2023-11-08 NOTE — ANESTHESIA PREPROCEDURE EVALUATION
Anesthesia Evaluation     Patient summary reviewed and Nursing notes reviewed   NPO Solid Status: > 8 hours  NPO Liquid Status: > 8 hours           Airway   Mallampati: I  TM distance: >3 FB  Neck ROM: full  No difficulty expected  Dental    (+) upper dentures    Pulmonary     breath sounds clear to auscultation  (+) a smoker Current, COPD, asthma,  Cardiovascular     Rhythm: regular  Rate: normal        Neuro/Psych  (+) numbness, psychiatric history Anxiety  GI/Hepatic/Renal/Endo    (+) hepatitis C, diabetes mellitus type 2    Musculoskeletal     Abdominal    Substance History - negative use     OB/GYN negative ob/gyn ROS         Other   arthritis,                       Anesthesia Plan    ASA 2     general     intravenous induction     Anesthetic plan, risks, benefits, and alternatives have been provided, discussed and informed consent has been obtained with: patient.  Pre-procedure education provided  Plan discussed with CRNA.        CODE STATUS:

## 2023-11-16 NOTE — PROGRESS NOTES
"Patient: Peter Ghotra    YOB: 1955    Date: 11/17/2023    Primary Care Provider: Arely Garrett MD    Chief Complaint   Patient presents with    Post-op Hernia       History of present illness:  I saw the patient in the office today as a followup from their recent inguinal hernia repair 11/8/23. They state that they have done well and are having no complaints.    Vital Signs:   Vitals:    11/17/23 0902   BP: 120/72   Pulse: 65   Temp: 98.2 °F (36.8 °C)   SpO2: 98%   Weight: 68.3 kg (150 lb 9.6 oz)   Height: 162.6 cm (64.02\")       Physical Exam:   General Appearance:    Alert, cooperative, in no acute distress   Abdomen:     no masses, no organomegaly, soft non-tender, non-distended, no guarding, wounds are well healed, no evidence of recurrent hernia     Assessment / Plan:    1. Postoperative visit        Doing well after femoral hernia repair in the right side.  Having no issues.  Activity instructions were given.  He will follow-up with me as needed  Electronically signed by Aubrey Borrego MD  11/17/23                "

## 2023-11-17 ENCOUNTER — OFFICE VISIT (OUTPATIENT)
Dept: SURGERY | Facility: CLINIC | Age: 68
End: 2023-11-17
Payer: MEDICAID

## 2023-11-17 VITALS
OXYGEN SATURATION: 98 % | SYSTOLIC BLOOD PRESSURE: 120 MMHG | HEART RATE: 65 BPM | HEIGHT: 64 IN | DIASTOLIC BLOOD PRESSURE: 72 MMHG | WEIGHT: 150.6 LBS | BODY MASS INDEX: 25.71 KG/M2 | TEMPERATURE: 98.2 F

## 2023-11-17 DIAGNOSIS — Z48.89 POSTOPERATIVE VISIT: Primary | ICD-10-CM

## 2023-11-17 PROCEDURE — 1159F MED LIST DOCD IN RCRD: CPT | Performed by: SURGERY

## 2023-11-17 PROCEDURE — 99024 POSTOP FOLLOW-UP VISIT: CPT | Performed by: SURGERY

## 2023-11-17 PROCEDURE — 1160F RVW MEDS BY RX/DR IN RCRD: CPT | Performed by: SURGERY

## 2024-02-19 ENCOUNTER — OFFICE VISIT (OUTPATIENT)
Dept: INTERNAL MEDICINE | Facility: CLINIC | Age: 69
End: 2024-02-19
Payer: MEDICARE

## 2024-02-19 VITALS
WEIGHT: 152 LBS | SYSTOLIC BLOOD PRESSURE: 124 MMHG | HEART RATE: 67 BPM | RESPIRATION RATE: 17 BRPM | DIASTOLIC BLOOD PRESSURE: 70 MMHG | OXYGEN SATURATION: 100 % | HEIGHT: 64 IN | BODY MASS INDEX: 25.95 KG/M2

## 2024-02-19 DIAGNOSIS — J43.1 PANLOBULAR EMPHYSEMA: ICD-10-CM

## 2024-02-19 DIAGNOSIS — B18.2 CHRONIC HEPATITIS C WITHOUT HEPATIC COMA: ICD-10-CM

## 2024-02-19 DIAGNOSIS — G25.81 RESTLESS LEGS SYNDROME: ICD-10-CM

## 2024-02-19 DIAGNOSIS — E11.65 TYPE 2 DIABETES MELLITUS WITH HYPERGLYCEMIA, WITHOUT LONG-TERM CURRENT USE OF INSULIN: Primary | ICD-10-CM

## 2024-02-19 DIAGNOSIS — R91.1 LUNG NODULE: ICD-10-CM

## 2024-02-19 DIAGNOSIS — E55.9 VITAMIN D DEFICIENCY, UNSPECIFIED: ICD-10-CM

## 2024-02-19 DIAGNOSIS — K40.91 UNILATERAL RECURRENT INGUINAL HERNIA WITHOUT OBSTRUCTION OR GANGRENE: ICD-10-CM

## 2024-02-19 PROBLEM — R25.2 LEG CRAMP: Status: RESOLVED | Noted: 2022-12-27 | Resolved: 2024-02-19

## 2024-02-19 PROBLEM — N50.811 TESTICULAR PAIN, RIGHT: Status: RESOLVED | Noted: 2023-10-30 | Resolved: 2024-02-19

## 2024-02-19 LAB
EXPIRATION DATE: NORMAL
Lab: NORMAL
POC CREATININE URINE: NORMAL
POC MICROALBUMIN URINE: NORMAL

## 2024-02-19 PROCEDURE — 99213 OFFICE O/P EST LOW 20 MIN: CPT | Performed by: STUDENT IN AN ORGANIZED HEALTH CARE EDUCATION/TRAINING PROGRAM

## 2024-02-19 PROCEDURE — 82044 UR ALBUMIN SEMIQUANTITATIVE: CPT | Performed by: STUDENT IN AN ORGANIZED HEALTH CARE EDUCATION/TRAINING PROGRAM

## 2024-02-19 PROCEDURE — G0439 PPPS, SUBSEQ VISIT: HCPCS | Performed by: STUDENT IN AN ORGANIZED HEALTH CARE EDUCATION/TRAINING PROGRAM

## 2024-02-19 PROCEDURE — 99397 PER PM REEVAL EST PAT 65+ YR: CPT | Performed by: STUDENT IN AN ORGANIZED HEALTH CARE EDUCATION/TRAINING PROGRAM

## 2024-02-19 RX ORDER — FLUTICASONE FUROATE AND VILANTEROL 200; 25 UG/1; UG/1
1 POWDER RESPIRATORY (INHALATION)
Qty: 60 EACH | Refills: 3 | Status: SHIPPED | OUTPATIENT
Start: 2024-02-19

## 2024-02-19 RX ORDER — ROPINIROLE 0.5 MG/1
0.5 TABLET, FILM COATED ORAL NIGHTLY
Qty: 90 TABLET | Refills: 1 | Status: SHIPPED | OUTPATIENT
Start: 2024-02-19

## 2024-02-19 NOTE — PROGRESS NOTES
The ABCs of the Annual Wellness Visit  Subsequent Medicare Wellness Visit    Subjective    Peter Ghotra is a 68 y.o. male who presents for a Subsequent Medicare Wellness Visit.    Dm type 2: last A1c was 4/2023 was 7.4, on metformin 1000 twice a day  High blood pressure today: not on meds, does not check at home, denies symptoms. At home usually 125 at home  Inguinal hernia recently had surgery on 11/8/2023  COPD: no increase in work of breathing, compliant with breo and prn albuerol  Chronic hepatitis C: Stable     Current smoker: 50 years x 1/2 pack per day, 25-pack-year history. Decreased to less than half a pack a day  LDCT: last was 2/2023 lung nodules, repeat on 3/2024, ordered  Colonoscopy 9/2023, polyps, repeat in 3 years    The following portions of the patient's history were reviewed and   updated as appropriate: allergies, current medications, past family history, past medical history, past social history, past surgical history, and problem list.    Compared to one year ago, the patient feels his physical   health is better.    Compared to one year ago, the patient feels his mental   health is better.    Recent Hospitalizations:  He was not admitted to the hospital during the last year.       Current Medical Providers:  Patient Care Team:  Arely Garrett MD as PCP - General (Family Medicine)  Aubrey Borrego MD as Consulting Physician (General Surgery)    Outpatient Medications Prior to Visit   Medication Sig Dispense Refill    albuterol (ProAir RespiClick) 108 (90 Base) MCG/ACT inhaler Inhale 2 puffs Every 4 (Four) Hours As Needed for Wheezing. 2 each 5    metFORMIN (GLUCOPHAGE) 1000 MG tablet Take 1 tablet by mouth 2 (Two) Times a Day With Meals. 180 tablet 1    naproxen (Naprosyn) 500 MG tablet Take 1 tablet by mouth 2 (Two) Times a Day With Meals. 90 tablet 1    Breo Ellipta 100-25 MCG/ACT aerosol powder  INHALE 1 PUFF BY MOUTH DAILY 60 each 3     No facility-administered medications  "prior to visit.       No opioid medication identified on active medication list. I have reviewed chart for other potential  high risk medication/s and harmful drug interactions in the elderly.        Aspirin is not on active medication list.  Aspirin use is not indicated based on review of current medical condition/s. Risk of harm outweighs potential benefits.  .    Patient Active Problem List   Diagnosis    Alopecia    Tobacco abuse    Chronic pain of right knee    Right lumbar radiculopathy    Panlobular emphysema    Vitamin D deficiency, unspecified    Chronic hepatitis C without hepatic coma    Lung nodule    Type 2 diabetes mellitus with hyperglycemia, without long-term current use of insulin    Unilateral recurrent inguinal hernia without obstruction or gangrene    Femoral hernia of right side    Restless legs syndrome     Advance Care Planning   Advance Care Planning     Advance Directive is not on file.  ACP discussion was held with the patient during this visit. Patient does not have an advance directive, information provided.     Objective    Vitals:    02/19/24 0832   BP: 124/70   Pulse: 67   Resp: 17   SpO2: 100%   Weight: 68.9 kg (152 lb)   Height: 162.6 cm (64.02\")     Estimated body mass index is 26.08 kg/m² as calculated from the following:    Height as of this encounter: 162.6 cm (64.02\").    Weight as of this encounter: 68.9 kg (152 lb).    BMI is >= 25 and <30. (Overweight) The following options were offered after discussion;: exercise counseling/recommendations and nutrition counseling/recommendations      Does the patient have evidence of cognitive impairment? No          HEALTH RISK ASSESSMENT    Smoking Status:  Social History     Tobacco Use   Smoking Status Every Day    Packs/day: 1.00    Years: 15.00    Additional pack years: 0.00    Total pack years: 15.00    Types: Cigarettes   Smokeless Tobacco Never     Alcohol Consumption:  Social History     Substance and Sexual Activity   Alcohol Use " Yes    Alcohol/week: 7.0 standard drinks of alcohol    Types: 7 Cans of beer per week    Comment: 1 beer per day     Fall Risk Screen:    BLAYNE Fall Risk Assessment was completed, and patient is at LOW risk for falls.Assessment completed on:2024    Depression Screenin/19/2024     8:34 AM   PHQ-2/PHQ-9 Depression Screening   Little Interest or Pleasure in Doing Things 0-->not at all   Feeling Down, Depressed or Hopeless 0-->not at all   PHQ-9: Brief Depression Severity Measure Score 0       Health Habits and Functional and Cognitive Screenin/7/2023    10:38 AM   Functional & Cognitive Status   Do you have difficulty preparing food and eating? No   Do you have difficulty bathing yourself, getting dressed or grooming yourself? No   Do you have difficulty using the toilet? No   Do you have difficulty moving around from place to place? Yes   Do you have trouble with steps or getting out of a bed or a chair? Yes   Current Diet Well Balanced Diet   Dental Exam Unknown        Dental Exam Comment Wears dentures   Eye Exam Up to date        Eye Exam Comment    Exercise (times per week) 0 times per week   Current Exercises Include No Regular Exercise   Do you need help using the phone?  No   Are you deaf or do you have serious difficulty hearing?  No   Do you need help to go to places out of walking distance? No   Do you need help shopping? No   Do you need help preparing meals?  No   Do you need help with housework?  No   Do you need help with laundry? No   Do you need help taking your medications? No   Do you need help managing money? No   Do you ever drive or ride in a car without wearing a seat belt? No   Have you felt unusual stress, anger or loneliness in the last month? Yes   Who do you live with? Spouse   If you need help, do you have trouble finding someone available to you? No   Have you been bothered in the last four weeks by sexual problems? No   Do you have difficulty concentrating,  remembering or making decisions? Yes       Age-appropriate Screening Schedule:  Refer to the list below for future screening recommendations based on patient's age, sex and/or medical conditions. Orders for these recommended tests are listed in the plan section. The patient has been provided with a written plan.    Health Maintenance   Topic Date Due    URINE MICROALBUMIN  Never done    Hepatitis B (1 of 3 - Risk 3-dose series) Never done    BMI FOLLOWUP  02/07/2024    RSV Vaccine - Adults (1 - 1-dose 60+ series) 02/19/2025 (Originally 5/14/2015)    ZOSTER VACCINE (1 of 2) 03/05/2025 (Originally 5/14/2005)    HEMOGLOBIN A1C  04/30/2024    DIABETIC EYE EXAM  11/24/2024    ANNUAL WELLNESS VISIT  02/19/2025    DIABETIC FOOT EXAM  02/19/2025    TDAP/TD VACCINES (2 - Td or Tdap) 11/23/2027    COLORECTAL CANCER SCREENING  09/25/2033    HEPATITIS C SCREENING  Completed    COVID-19 Vaccine  Completed    INFLUENZA VACCINE  Completed    Pneumococcal Vaccine 65+  Completed    AAA SCREEN (ONE-TIME)  Completed                  CMS Preventative Services Quick Reference  Risk Factors Identified During Encounter  None Identified  The above risks/problems have been discussed with the patient.  Pertinent information has been shared with the patient in the After Visit Summary.  An After Visit Summary and PPPS were made available to the patient.    Follow Up:   Next Medicare Wellness visit to be scheduled in 1 year.       Additional E&M Note during same encounter follows:  Patient has multiple medical problems which are significant and separately identifiable that require additional work above and beyond the Medicare Wellness Visit.      Chief Complaint  Medicare Wellness-subsequent (Annual MWV)    Subjective        HPI  Peter Ghotra is also being seen today for bilateral lower extremity tingling sensation and cramping. Happens at night, needs to walk around to relieve the cramping.     He also complains of loss of concentration  "whenever he is doing something.  Denies any memory loss, headaches, vision changes.  Denies depression and anxiety symptoms.         Objective   Vital Signs:  /70   Pulse 67   Resp 17   Ht 162.6 cm (64.02\")   Wt 68.9 kg (152 lb)   SpO2 100%   BMI 26.08 kg/m²     Physical Exam  Vitals and nursing note reviewed.   Constitutional:       Appearance: Normal appearance.   HENT:      Head: Normocephalic and atraumatic.   Cardiovascular:      Rate and Rhythm: Normal rate and regular rhythm.      Pulses: Normal pulses.           Dorsalis pedis pulses are 2+ on the right side and 2+ on the left side.        Posterior tibial pulses are 2+ on the right side and 2+ on the left side.      Heart sounds: Normal heart sounds.   Pulmonary:      Effort: Pulmonary effort is normal. No respiratory distress.      Breath sounds: Normal breath sounds. No wheezing, rhonchi or rales.   Abdominal:      General: Abdomen is flat. Bowel sounds are normal.      Palpations: Abdomen is soft.      Tenderness: There is no abdominal tenderness. There is no guarding.   Musculoskeletal:      Cervical back: Neck supple.   Feet:      Comments: Diabetic Foot Exam Performed and Monofilament Test Performed no neuropathy      Skin:     General: Skin is warm.      Capillary Refill: Capillary refill takes less than 2 seconds.   Neurological:      General: No focal deficit present.      Mental Status: He is alert. Mental status is at baseline.   Psychiatric:         Mood and Affect: Mood normal.         Behavior: Behavior normal.                         Assessment and Plan   Diagnoses and all orders for this visit:    1. Type 2 diabetes mellitus with hyperglycemia, without long-term current use of insulin (Primary)  Assessment & Plan:   repeat A1c.  On metformin 1000 twice a day  Stable on current medication and dosage. Will continue current management. Refill medication as necessary.      Orders:  -     CBC & Differential  -     Comprehensive " Metabolic Panel  -     Lipid Panel  -     TSH Rfx On Abnormal To Free T4  -     Hemoglobin A1c    2. Chronic hepatitis C without hepatic coma  Assessment & Plan:  Stable at this time, follows with GI      3. Unilateral recurrent inguinal hernia without obstruction or gangrene  Assessment & Plan:  Status post surgery 11/2023      4. Lung nodule  Assessment & Plan:  For repeat CT chest on 3/2024  Stable for now      5. Panlobular emphysema  Assessment & Plan:    Still with wheezing, increase Breo to 200-25 mcg inhaler  Continue albuterol as needed    Orders:  -     CBC & Differential  -     Comprehensive Metabolic Panel  -     Lipid Panel  -     TSH Rfx On Abnormal To Free T4  -     Fluticasone Furoate-Vilanterol (Breo Ellipta) 200-25 MCG/ACT inhaler; Inhale 1 puff Daily.  Dispense: 60 each; Refill: 3    6. Vitamin D deficiency, unspecified  Assessment & Plan:  Will repeat labs, not on vitamin D    Orders:  -     Vitamin D,25-Hydroxy    7. Restless legs syndrome  Assessment & Plan:  New problem.  Start ropinirole 0.5 mg at night  Will obtain labs to rule out any electrolyte imbalance    Orders:  -     rOPINIRole (REQUIP) 0.5 MG tablet; Take 1 tablet by mouth Every Night. Take 1 hour before bedtime.  Dispense: 90 tablet; Refill: 1  -     CBC & Differential  -     Comprehensive Metabolic Panel  -     Lipid Panel  -     TSH Rfx On Abnormal To Free T4      Patient Counseling:  Nutrition: Stressed importance of moderation in sodium/caffeine intake, saturated fat and cholesterol, caloric balance, sufficient intake of fresh fruits, vegetables, fiber, calcium, iron, and 1 g folate supplementation if of childbearing age.   Discussed the issue of calcium supplement             Mammogram recommended every 2 years from age 40-49 and yearly beginning at age 50.  Exercise: Stressed the importance of regular exercise.   Substance Abuse: Discussed cessation/primary prevention of tobacco (if applicable), alcohol, or other drug use (if  applicable); driving or other dangerous activities under the influence; availability of treatment for abuse.   Sexuality: Discussed sexually transmitted diseases, partner selection, use of condoms, avoidance of unintended pregnancy  and contraceptive alternatives.   Injury prevention: Discussed safety belts, safety helmets, smoke detector, smoking near bedding or upholstery.   Dental health: Discussed importance of regular tooth brushing, flossing, and dental visits every 6 months.  Immunizations reviewed.  Discussed benefits of screening colonoscopy (if applicable).  After hours service discussed with patient         Follow Up   Return in about 6 months (around 8/19/2024) for 6 month follow up.  Patient was given instructions and counseling regarding his condition or for health maintenance advice. Please see specific information pulled into the AVS if appropriate.

## 2024-02-19 NOTE — ASSESSMENT & PLAN NOTE
New problem.  Start ropinirole 0.5 mg at night  Will obtain labs to rule out any electrolyte imbalance

## 2024-02-19 NOTE — ASSESSMENT & PLAN NOTE
repeat A1c.  On metformin 1000 twice a day  Stable on current medication and dosage. Will continue current management. Refill medication as necessary.

## 2024-02-19 NOTE — PROGRESS NOTES
The ABCs of the Annual Wellness Visit  Subsequent Medicare Wellness Visit    Subjective    {Wrapup  Review (Popup)  Advance Care Planning  Labs  CC  Problem List  Visit Diagnosis  Medications  Result Review  Imaging  [facility]  [facility]  SnapShot  Encounters  Notes  Media  Procedures :23}  Peter Ghotra is a 68 y.o. male who presents for a Subsequent Medicare Wellness Visit.    Dm type 2: last A1c was 4/2023 was 7.4, on metformin 1000 twice a day  High blood pressure today: not on meds, does not check at home, denies symptoms  Inguinal hernia recently had surgery on 11/8/2023  COPD: no increase in work of breathing, compliant with breo and prn albuerol  Chronic hepatitis C: Stable     Current smoker: 50 years x 1/2 pack per day, 25-pack-year history  LDCT: last was 2/2023 lung nodules, repeat on 3/2024, ordered  Colonoscopy 9/2023, polyps, repeat in 3 years  Review of Systems   All other systems reviewed and are negative.       The following portions of the patient's history were reviewed and   updated as appropriate: allergies, current medications, past family history, past medical history, past social history, past surgical history, and problem list.    Compared to one year ago, the patient feels his physical   health is {better worse same:96254}.    Compared to one year ago, the patient feels his mental   health is {better worse same:37142}.    Recent Hospitalizations:  {Hospital Admission Status in the last 365 days:43839}      Current Medical Providers:  Patient Care Team:  Arely Garrett MD as PCP - General (Family Medicine)  Aubrey Borrego MD as Consulting Physician (General Surgery)    Outpatient Medications Prior to Visit   Medication Sig Dispense Refill    albuterol (ProAir RespiClick) 108 (90 Base) MCG/ACT inhaler Inhale 2 puffs Every 4 (Four) Hours As Needed for Wheezing. 2 each 5    Breo Ellipta 100-25 MCG/ACT aerosol powder  INHALE 1  "PUFF BY MOUTH DAILY 60 each 3    metFORMIN (GLUCOPHAGE) 1000 MG tablet Take 1 tablet by mouth 2 (Two) Times a Day With Meals. 180 tablet 1    naproxen (Naprosyn) 500 MG tablet Take 1 tablet by mouth 2 (Two) Times a Day With Meals. 90 tablet 1     No facility-administered medications prior to visit.       No opioid medication identified on active medication list. I have reviewed chart for other potential  high risk medication/s and harmful drug interactions in the elderly.        Aspirin is not on active medication list.  {ASPIRIN NOT ON MEDICATION LIST INDICATED/NOT INDICATED:99763}.    Patient Active Problem List   Diagnosis    Alopecia    Tobacco abuse    Leg cramp    Chronic pain of right knee    Right lumbar radiculopathy    Panlobular emphysema    Vitamin D deficiency, unspecified    Chronic hepatitis C without hepatic coma    Lung nodule    Type 2 diabetes mellitus with hyperglycemia, without long-term current use of insulin    Testicular pain, right    Unilateral recurrent inguinal hernia without obstruction or gangrene    Femoral hernia of right side     Advance Care Planning  Advance Directive is not on file.  {ACP Discussion, Advance Directive not in EMR:49530}     Objective    There were no vitals filed for this visit.  Estimated body mass index is 25.84 kg/m² as calculated from the following:    Height as of 11/17/23: 162.6 cm (64.02\").    Weight as of 11/17/23: 68.3 kg (150 lb 9.6 oz).    Physical Exam    {BMI is >= 25 and <30. (Overweight) The following options were offered after discussion;:[id number]}      Does the patient have evidence of cognitive impairment?   {Yes/No:36484}            HEALTH RISK ASSESSMENT    Smoking Status:  Social History     Tobacco Use   Smoking Status Every Day    Packs/day: 1.00    Years: 15.00    Additional pack years: 0.00    Total pack years: 15.00    Types: Cigarettes   Smokeless Tobacco Never     Alcohol Consumption:  Social History     Substance and Sexual Activity "   Alcohol Use Yes    Alcohol/week: 7.0 standard drinks of alcohol    Types: 7 Cans of beer per week    Comment: 1 beer per day     Fall Risk Screen:    BLAYNE Fall Risk Assessment has not been completed.    Depression Screenin/7/2023    10:36 AM   PHQ-2/PHQ-9 Depression Screening   Little Interest or Pleasure in Doing Things 0-->not at all   Feeling Down, Depressed or Hopeless 1-->several days   PHQ-9: Brief Depression Severity Measure Score 1       Health Habits and Functional and Cognitive Screenin/7/2023    10:38 AM   Functional & Cognitive Status   Do you have difficulty preparing food and eating? No   Do you have difficulty bathing yourself, getting dressed or grooming yourself? No   Do you have difficulty using the toilet? No   Do you have difficulty moving around from place to place? Yes   Do you have trouble with steps or getting out of a bed or a chair? Yes   Current Diet Well Balanced Diet   Dental Exam Unknown        Dental Exam Comment Wears dentures   Eye Exam Up to date        Eye Exam Comment    Exercise (times per week) 0 times per week   Current Exercises Include No Regular Exercise   Do you need help using the phone?  No   Are you deaf or do you have serious difficulty hearing?  No   Do you need help to go to places out of walking distance? No   Do you need help shopping? No   Do you need help preparing meals?  No   Do you need help with housework?  No   Do you need help with laundry? No   Do you need help taking your medications? No   Do you need help managing money? No   Do you ever drive or ride in a car without wearing a seat belt? No   Have you felt unusual stress, anger or loneliness in the last month? Yes   Who do you live with? Spouse   If you need help, do you have trouble finding someone available to you? No   Have you been bothered in the last four weeks by sexual problems? No   Do you have difficulty concentrating, remembering or making decisions? Yes        Age-appropriate Screening Schedule:  Refer to the list below for future screening recommendations based on patient's age, sex and/or medical conditions. Orders for these recommended tests are listed in the plan section. The patient has been provided with a written plan.    Health Maintenance   Topic Date Due    URINE MICROALBUMIN  Never done    Hepatitis B (1 of 3 - Risk 3-dose series) Never done    DIABETIC FOOT EXAM  Never done    DIABETIC EYE EXAM  12/27/2022    ANNUAL WELLNESS VISIT  02/07/2024    BMI FOLLOWUP  02/07/2024    RSV Vaccine - Adults (1 - 1-dose 60+ series) 02/19/2025 (Originally 5/14/2015)    ZOSTER VACCINE (1 of 2) 03/05/2025 (Originally 5/14/2005)    HEMOGLOBIN A1C  04/30/2024    TDAP/TD VACCINES (2 - Td or Tdap) 11/23/2027    COLORECTAL CANCER SCREENING  09/25/2033    HEPATITIS C SCREENING  Completed    COVID-19 Vaccine  Completed    INFLUENZA VACCINE  Completed    Pneumococcal Vaccine 65+  Completed    AAA SCREEN (ONE-TIME)  Completed                CMS Preventative Services Quick Reference  Risk Factors Identified During Encounter:    {Medicare Wellness Risk Factors:26917}    The above risks/problems have been discussed with the patient.  Pertinent information has been shared with the patient in the After Visit Summary.    Diagnoses and all orders for this visit:    1. Type 2 diabetes mellitus with hyperglycemia, without long-term current use of insulin (Primary)    2. Chronic hepatitis C without hepatic coma    3. Unilateral recurrent inguinal hernia without obstruction or gangrene    4. Lung nodule    5. Panlobular emphysema        Follow Up:   Next Medicare Wellness visit to be scheduled in 1 year.      An After Visit Summary and PPPS were made available to the patient.

## 2024-02-27 DIAGNOSIS — J43.1 PANLOBULAR EMPHYSEMA: Primary | ICD-10-CM

## 2024-02-27 RX ORDER — FLUTICASONE FUROATE AND VILANTEROL 100; 25 UG/1; UG/1
1 POWDER RESPIRATORY (INHALATION)
Qty: 60 EACH | Refills: 5 | Status: SHIPPED | OUTPATIENT
Start: 2024-02-27

## 2024-04-04 DIAGNOSIS — J43.1 PANLOBULAR EMPHYSEMA: Primary | ICD-10-CM

## 2024-06-04 ENCOUNTER — OFFICE VISIT (OUTPATIENT)
Dept: INTERNAL MEDICINE | Facility: CLINIC | Age: 69
End: 2024-06-04
Payer: MEDICARE

## 2024-06-04 ENCOUNTER — HOSPITAL ENCOUNTER (OUTPATIENT)
Dept: CT IMAGING | Facility: HOSPITAL | Age: 69
Discharge: HOME | End: 2024-06-04
Admitting: STUDENT IN AN ORGANIZED HEALTH CARE EDUCATION/TRAINING PROGRAM
Payer: MEDICARE

## 2024-06-04 VITALS
HEART RATE: 77 BPM | SYSTOLIC BLOOD PRESSURE: 128 MMHG | BODY MASS INDEX: 25.78 KG/M2 | OXYGEN SATURATION: 94 % | RESPIRATION RATE: 17 BRPM | WEIGHT: 151 LBS | DIASTOLIC BLOOD PRESSURE: 74 MMHG | HEIGHT: 64 IN

## 2024-06-04 DIAGNOSIS — R10.9 RIGHT FLANK PAIN: Primary | ICD-10-CM

## 2024-06-04 LAB
BILIRUB BLD-MCNC: NEGATIVE MG/DL
CLARITY, POC: ABNORMAL
COLOR UR: ABNORMAL
EXPIRATION DATE: ABNORMAL
GLUCOSE UR STRIP-MCNC: ABNORMAL MG/DL
KETONES UR QL: NEGATIVE
LEUKOCYTE EST, POC: NEGATIVE
Lab: ABNORMAL
NITRITE UR-MCNC: NEGATIVE MG/ML
PH UR: 6 [PH] (ref 5–8)
PROT UR STRIP-MCNC: NEGATIVE MG/DL
RBC # UR STRIP: NEGATIVE /UL
SP GR UR: 1.02 (ref 1–1.03)
UROBILINOGEN UR QL: NORMAL

## 2024-06-04 PROCEDURE — 81003 URINALYSIS AUTO W/O SCOPE: CPT | Performed by: STUDENT IN AN ORGANIZED HEALTH CARE EDUCATION/TRAINING PROGRAM

## 2024-06-04 PROCEDURE — 99214 OFFICE O/P EST MOD 30 MIN: CPT | Performed by: STUDENT IN AN ORGANIZED HEALTH CARE EDUCATION/TRAINING PROGRAM

## 2024-06-04 PROCEDURE — 74176 CT ABD & PELVIS W/O CONTRAST: CPT

## 2024-06-04 PROCEDURE — 1125F AMNT PAIN NOTED PAIN PRSNT: CPT | Performed by: STUDENT IN AN ORGANIZED HEALTH CARE EDUCATION/TRAINING PROGRAM

## 2024-06-04 RX ORDER — NAPROXEN 500 MG/1
500 TABLET ORAL 2 TIMES DAILY WITH MEALS
Qty: 90 TABLET | Refills: 0 | Status: SHIPPED | OUTPATIENT
Start: 2024-06-04

## 2024-06-04 RX ORDER — FLUTICASONE FUROATE AND VILANTEROL TRIFENATATE 100; 25 UG/1; UG/1
1 POWDER RESPIRATORY (INHALATION) DAILY
COMMUNITY
Start: 2024-05-31

## 2024-06-04 RX ORDER — CIPROFLOXACIN 500 MG/1
500 TABLET, FILM COATED ORAL 2 TIMES DAILY
Qty: 10 TABLET | Refills: 0 | Status: SHIPPED | OUTPATIENT
Start: 2024-06-04 | End: 2024-06-09

## 2024-06-04 RX ORDER — METHOCARBAMOL 500 MG/1
500 TABLET, FILM COATED ORAL 3 TIMES DAILY
Qty: 30 TABLET | Refills: 0 | Status: SHIPPED | OUTPATIENT
Start: 2024-06-04

## 2024-06-04 NOTE — ASSESSMENT & PLAN NOTE
Have CT abd pelv stone protocol and CBC done today  Increase water intake (at least 2L/day)  Urinate more frequently, do not hold urination  Urinate right after sexual intercourse  May take over the counter cranberry tablets 2 tablets daily  May take over the counter probiotic tablets daily    Start prescribed antibiotics - ciprofloxacin. Advised patient regarding antibiotic resistance and required completion of prescribed antibiotics. Pending urine culture. Advised patient that culture may take 3-7 business days. If antibiotic is not susceptible for current infection patient may have continuous or even worsening symptoms despite provided antibiotics, advised to proceed to the emergency room if so. Patient agreed and showed complete understanding.

## 2024-06-04 NOTE — PROGRESS NOTES
"    Office Note     Name: Peter Ghotra    : 1955     MRN: [id number]     Chief Complaint  Back Pain (X3 days. )    Subjective     History of Present Illness:  Peter Ghotra is a 69 y.o. male who presents today for right flank pain. Not related to food intake no urinary symptoms. No fever, dysuria, hematuria      Family History:   Family History   Problem Relation Age of Onset    Arthritis Father        Social History:   Social History     Socioeconomic History    Marital status: Single   Tobacco Use    Smoking status: Every Day     Current packs/day: 1.00     Average packs/day: 1 pack/day for 15.0 years (15.0 ttl pk-yrs)     Types: Cigarettes    Smokeless tobacco: Never   Vaping Use    Vaping status: Never Used   Substance and Sexual Activity    Alcohol use: Yes     Alcohol/week: 2.0 standard drinks of alcohol     Types: 2 Cans of beer per week     Comment: 1 beer per day    Drug use: Never    Sexual activity: Not Currently     Partners: Female     Birth control/protection: None       Health Maintenance   Topic Date Due    Hepatitis B (1 of 3 - Risk 3-dose series) Never done    COVID-19 Vaccine (2023- season) 2024    HEMOGLOBIN A1C  2024    RSV Vaccine - Adults (1 - 1-dose 60+ series) 2025 (Originally 2015)    ZOSTER VACCINE (1 of 2) 2025 (Originally 2005)    INFLUENZA VACCINE  2024    DIABETIC EYE EXAM  2024    ANNUAL WELLNESS VISIT  2025    DIABETIC FOOT EXAM  2025    URINE MICROALBUMIN  2025    BMI FOLLOWUP  2025    TDAP/TD VACCINES (2 - Td or Tdap) 2027    COLORECTAL CANCER SCREENING  2033    HEPATITIS C SCREENING  Completed    Pneumococcal Vaccine 65+  Completed    AAA SCREEN (ONE-TIME)  Completed       Objective     Vital Signs  /74   Pulse 77   Resp 17   Ht 162.6 cm (64.02\")   Wt 68.5 kg (151 lb)   SpO2 94%   BMI 25.91 kg/m²   Estimated body mass index is 25.91 kg/m² as calculated from the " "following:    Height as of this encounter: 162.6 cm (64.02\").    Weight as of this encounter: 68.5 kg (151 lb).        Physical Exam  Vitals and nursing note reviewed.   Constitutional:       Appearance: Normal appearance.   HENT:      Head: Normocephalic and atraumatic.   Cardiovascular:      Rate and Rhythm: Normal rate and regular rhythm.      Pulses: Normal pulses.      Heart sounds: Normal heart sounds.   Pulmonary:      Effort: Pulmonary effort is normal. No respiratory distress.      Breath sounds: Normal breath sounds. No wheezing, rhonchi or rales.   Abdominal:      General: Abdomen is flat. Bowel sounds are normal.      Palpations: Abdomen is soft.      Tenderness: There is no abdominal tenderness. There is no guarding.   Genitourinary:     Comments: CVA tenderness R, no pelvic tenderness  Musculoskeletal:      Cervical back: Neck supple.   Skin:     General: Skin is warm.      Capillary Refill: Capillary refill takes less than 2 seconds.   Neurological:      General: No focal deficit present.      Mental Status: He is alert. Mental status is at baseline.   Psychiatric:         Mood and Affect: Mood normal.         Behavior: Behavior normal.          Procedures     Assessment and Plan     Diagnoses and all orders for this visit:    1. Right flank pain (Primary)  Assessment & Plan:  Have CT abd pelv stone protocol and CBC done today  Increase water intake (at least 2L/day)  Urinate more frequently, do not hold urination  Urinate right after sexual intercourse  May take over the counter cranberry tablets 2 tablets daily  May take over the counter probiotic tablets daily    Start prescribed antibiotics - ciprofloxacin. Advised patient regarding antibiotic resistance and required completion of prescribed antibiotics. Pending urine culture. Advised patient that culture may take 3-7 business days. If antibiotic is not susceptible for current infection patient may have continuous or even worsening symptoms " despite provided antibiotics, advised to proceed to the emergency room if so. Patient agreed and showed complete understanding.      Orders:  -     CT Abdomen Pelvis Stone Protocol  -     CBC w AUTO Differential  -     ciprofloxacin (Cipro) 500 MG tablet; Take 1 tablet by mouth 2 (Two) Times a Day for 5 days.  Dispense: 10 tablet; Refill: 0  -     naproxen (Naprosyn) 500 MG tablet; Take 1 tablet by mouth 2 (Two) Times a Day With Meals.  Dispense: 90 tablet; Refill: 0         Counseling was given to patient for the following topics: instructions for management, risks and benefits of treatment options, and importance of treatment compliance.    Follow Up  No follow-ups on file.    MD UGO Heller [facility]  [address] [place] [id number] [phone]

## 2024-06-05 LAB
BASOPHILS # BLD AUTO: 0.06 10*3/MM3 (ref 0–0.2)
BASOPHILS NFR BLD AUTO: 0.6 % (ref 0–1.5)
EOSINOPHIL # BLD AUTO: 0.21 10*3/MM3 (ref 0–0.4)
EOSINOPHIL NFR BLD AUTO: 2.1 % (ref 0.3–6.2)
ERYTHROCYTE [DISTWIDTH] IN BLOOD BY AUTOMATED COUNT: 12 % (ref 12.3–15.4)
HCT VFR BLD AUTO: 47.8 % (ref 37.5–51)
HGB BLD-MCNC: 16.4 G/DL (ref 13–17.7)
IMM GRANULOCYTES # BLD AUTO: 0.04 10*3/MM3 (ref 0–0.05)
IMM GRANULOCYTES NFR BLD AUTO: 0.4 % (ref 0–0.5)
LYMPHOCYTES # BLD AUTO: 2.23 10*3/MM3 (ref 0.7–3.1)
LYMPHOCYTES NFR BLD AUTO: 22.7 % (ref 19.6–45.3)
MCH RBC QN AUTO: 33.1 PG (ref 26.6–33)
MCHC RBC AUTO-ENTMCNC: 34.3 G/DL (ref 31.5–35.7)
MCV RBC AUTO: 96.4 FL (ref 79–97)
MONOCYTES # BLD AUTO: 0.83 10*3/MM3 (ref 0.1–0.9)
MONOCYTES NFR BLD AUTO: 8.4 % (ref 5–12)
NEUTROPHILS # BLD AUTO: 6.47 10*3/MM3 (ref 1.7–7)
NEUTROPHILS NFR BLD AUTO: 65.8 % (ref 42.7–76)
NRBC BLD AUTO-RTO: 0 /100 WBC (ref 0–0.2)
PLATELET # BLD AUTO: 182 10*3/MM3 (ref 140–450)
RBC # BLD AUTO: 4.96 10*6/MM3 (ref 4.14–5.8)
WBC # BLD AUTO: 9.84 10*3/MM3 (ref 3.4–10.8)

## 2024-06-06 LAB
BACTERIA UR CULT: NO GROWTH
BACTERIA UR CULT: NORMAL

## 2024-06-13 ENCOUNTER — OFFICE VISIT (OUTPATIENT)
Dept: INTERNAL MEDICINE | Facility: CLINIC | Age: 69
End: 2024-06-13
Payer: MEDICARE

## 2024-06-13 VITALS
OXYGEN SATURATION: 96 % | DIASTOLIC BLOOD PRESSURE: 72 MMHG | SYSTOLIC BLOOD PRESSURE: 152 MMHG | WEIGHT: 153 LBS | HEIGHT: 64 IN | HEART RATE: 79 BPM | RESPIRATION RATE: 16 BRPM | BODY MASS INDEX: 26.12 KG/M2

## 2024-06-13 DIAGNOSIS — M16.9 HIP ARTHROSIS: Primary | ICD-10-CM

## 2024-06-13 DIAGNOSIS — E11.65 TYPE 2 DIABETES MELLITUS WITH HYPERGLYCEMIA, WITHOUT LONG-TERM CURRENT USE OF INSULIN: ICD-10-CM

## 2024-06-13 PROBLEM — R10.9 RIGHT FLANK PAIN: Status: RESOLVED | Noted: 2024-06-04 | Resolved: 2024-06-13

## 2024-06-13 LAB
EXPIRATION DATE: ABNORMAL
HBA1C MFR BLD: 7.8 % (ref 4.5–5.7)
Lab: ABNORMAL

## 2024-06-13 PROCEDURE — 99214 OFFICE O/P EST MOD 30 MIN: CPT | Performed by: STUDENT IN AN ORGANIZED HEALTH CARE EDUCATION/TRAINING PROGRAM

## 2024-06-13 PROCEDURE — 3051F HG A1C>EQUAL 7.0%<8.0%: CPT | Performed by: STUDENT IN AN ORGANIZED HEALTH CARE EDUCATION/TRAINING PROGRAM

## 2024-06-13 PROCEDURE — 83036 HEMOGLOBIN GLYCOSYLATED A1C: CPT | Performed by: STUDENT IN AN ORGANIZED HEALTH CARE EDUCATION/TRAINING PROGRAM

## 2024-06-13 PROCEDURE — G2211 COMPLEX E/M VISIT ADD ON: HCPCS | Performed by: STUDENT IN AN ORGANIZED HEALTH CARE EDUCATION/TRAINING PROGRAM

## 2024-06-13 PROCEDURE — 1125F AMNT PAIN NOTED PAIN PRSNT: CPT | Performed by: STUDENT IN AN ORGANIZED HEALTH CARE EDUCATION/TRAINING PROGRAM

## 2024-06-13 RX ORDER — MELOXICAM 7.5 MG/1
7.5 TABLET ORAL DAILY
Qty: 30 TABLET | Refills: 1 | Status: SHIPPED | OUTPATIENT
Start: 2024-06-13

## 2024-06-13 NOTE — ASSESSMENT & PLAN NOTE
Will obtain MRI of hip first.  Advised to call urologist to follow-up on microlithiasis found on scrotal ultrasound, could be related to his groin pain at this time.  Unlikely to be his inguinal hernia given patient's lack of symptoms including tenderness in the groin, bulging or redness in the groin area.  Stop other NSAIDs.  Start meloxicam May continue Robaxin.

## 2024-06-13 NOTE — ASSESSMENT & PLAN NOTE
A1c elevated at 7.8   Start Jardiance 10 mg daily, advised of adverse effects including UTI, penile infection and gangrene.  Patient agreed and showed complete understanding to call if with any symptoms.  Repeat A1c next visit   Continue metformin 1000 twice a day

## 2024-06-13 NOTE — PROGRESS NOTES
Office Note     Name: Peter Ghotra    : 1955     MRN: [id number]     Chief Complaint  Hip Pain    Subjective     History of Present Illness:  Peter Ghotra is a 69 y.o. male who presents today for right lower back pain and diabetes    Right lower back pain noted to be persistent and worse when walking, he was diagnosed with a bilateral hip arthrosis last year showing on previous hip x-ray.  He now notes that pain from his right lower back radiates to his groin.  He did have a history of inguinal hernia on the right side as well as microlithiasis found on scrotal ultrasound on 10/2023.  He denies any bulge, pain on palpation of his groin, rashes, redness or problems with urination.      Family History:   Family History   Problem Relation Age of Onset    Arthritis Father        Social History:   Social History     Socioeconomic History    Marital status: Single   Tobacco Use    Smoking status: Every Day     Current packs/day: 1.00     Average packs/day: 1 pack/day for 15.0 years (15.0 ttl pk-yrs)     Types: Cigarettes    Smokeless tobacco: Never   Vaping Use    Vaping status: Never Used   Substance and Sexual Activity    Alcohol use: Yes     Alcohol/week: 2.0 standard drinks of alcohol     Types: 2 Cans of beer per week     Comment: 1 beer per day    Drug use: Never    Sexual activity: Not Currently     Partners: Female     Birth control/protection: None       Health Maintenance   Topic Date Due    Hepatitis B (1 of 3 - Risk 3-dose series) Never done    HEMOGLOBIN A1C  2024    RSV Vaccine - Adults (1 - 1-dose 60+ series) 2025 (Originally 2015)    ZOSTER VACCINE (1 of 2) 2025 (Originally 2005)    COVID-19 Vaccine (2023- season) 2025 (Originally 2024)    INFLUENZA VACCINE  2024    DIABETIC EYE EXAM  2024    ANNUAL WELLNESS VISIT  2025    DIABETIC FOOT EXAM  2025    URINE MICROALBUMIN  2025    BMI FOLLOWUP  2025     "TDAP/TD VACCINES (2 - Td or Tdap) 11/23/2027    COLORECTAL CANCER SCREENING  09/25/2033    HEPATITIS C SCREENING  Completed    Pneumococcal Vaccine 65+  Completed    AAA SCREEN (ONE-TIME)  Completed       Objective     Vital Signs  /72   Pulse 79   Resp 16   Ht 162.6 cm (64.02\")   Wt 69.4 kg (153 lb)   SpO2 96%   BMI 26.25 kg/m²   Estimated body mass index is 26.25 kg/m² as calculated from the following:    Height as of this encounter: 162.6 cm (64.02\").    Weight as of this encounter: 69.4 kg (153 lb).        Physical Exam  Vitals reviewed.   HENT:      Head: Normocephalic.   Musculoskeletal:         General: Tenderness (Minimal tenderness in right lower back.) present. Normal range of motion.      Comments: No lump or bulge on right groin area   Neurological:      Mental Status: He is alert.          Procedures     Assessment and Plan     Diagnoses and all orders for this visit:    1. Hip arthrosis (Primary)  Assessment & Plan:  Will obtain MRI of hip first.  Advised to call urologist to follow-up on microlithiasis found on scrotal ultrasound, could be related to his groin pain at this time.  Unlikely to be his inguinal hernia given patient's lack of symptoms including tenderness in the groin, bulging or redness in the groin area.  Stop other NSAIDs.  Start meloxicam May continue Robaxin.    Orders:  -     MRI hip right w contrast; Future  -     meloxicam (Mobic) 7.5 MG tablet; Take 1 tablet by mouth Daily.  Dispense: 30 tablet; Refill: 1    2. Type 2 diabetes mellitus with hyperglycemia, without long-term current use of insulin  Assessment & Plan:  A1c elevated at 7.8   Start Jardiance 10 mg daily, advised of adverse effects including UTI, penile infection and gangrene.  Patient agreed and showed complete understanding to call if with any symptoms.  Repeat A1c next visit   Continue metformin 1000 twice a day     Orders:  -     empagliflozin (Jardiance) 10 MG tablet tablet; Take 1 tablet by mouth " Daily.  Dispense: 90 tablet; Refill: 1         Counseling was given to patient for the following topics: instructions for management, risks and benefits of treatment options, and importance of treatment compliance.    Follow Up  Return in about 4 months (around 10/13/2024) for Recheck a1c.    MD UGO Heller [facility]  [address] [place] [phone]

## 2024-07-25 DIAGNOSIS — M16.9 HIP ARTHROSIS: ICD-10-CM

## 2024-07-25 RX ORDER — MELOXICAM 7.5 MG/1
7.5 TABLET ORAL DAILY
Qty: 30 TABLET | Refills: 1 | OUTPATIENT
Start: 2024-07-25

## 2024-07-30 ENCOUNTER — HOSPITAL ENCOUNTER (OUTPATIENT)
Dept: MRI IMAGING | Facility: HOSPITAL | Age: 69
Discharge: HOME | End: 2024-07-30
Admitting: STUDENT IN AN ORGANIZED HEALTH CARE EDUCATION/TRAINING PROGRAM
Payer: MEDICARE

## 2024-07-30 DIAGNOSIS — M16.9 HIP ARTHROSIS: ICD-10-CM

## 2024-07-30 PROCEDURE — 73721 MRI JNT OF LWR EXTRE W/O DYE: CPT

## 2024-07-31 DIAGNOSIS — M16.9 HIP ARTHROSIS: Primary | ICD-10-CM

## 2024-08-06 DIAGNOSIS — J43.1 PANLOBULAR EMPHYSEMA: Primary | ICD-10-CM

## 2024-08-09 DIAGNOSIS — M25.551 ARTHRALGIA OF RIGHT HIP: Primary | ICD-10-CM

## 2024-08-10 DIAGNOSIS — J43.1 PANLOBULAR EMPHYSEMA: Primary | ICD-10-CM

## 2024-08-10 RX ORDER — UMECLIDINIUM BROMIDE AND VILANTEROL TRIFENATATE 62.5; 25 UG/1; UG/1
1 POWDER RESPIRATORY (INHALATION)
Qty: 60 EACH | Refills: 5 | Status: SHIPPED | OUTPATIENT
Start: 2024-08-10

## 2024-08-12 ENCOUNTER — OFFICE VISIT (OUTPATIENT)
Dept: ORTHOPEDIC SURGERY | Facility: CLINIC | Age: 69
End: 2024-08-12
Payer: MEDICARE

## 2024-08-12 VITALS
SYSTOLIC BLOOD PRESSURE: 110 MMHG | DIASTOLIC BLOOD PRESSURE: 60 MMHG | HEIGHT: 64 IN | WEIGHT: 151.2 LBS | BODY MASS INDEX: 25.81 KG/M2

## 2024-08-12 DIAGNOSIS — M76.31 IT BAND SYNDROME, RIGHT: ICD-10-CM

## 2024-08-12 DIAGNOSIS — M16.11 PRIMARY OSTEOARTHRITIS OF RIGHT HIP: Primary | ICD-10-CM

## 2024-08-12 DIAGNOSIS — M25.551 ARTHRALGIA OF RIGHT HIP: ICD-10-CM

## 2024-08-12 PROCEDURE — 99203 OFFICE O/P NEW LOW 30 MIN: CPT | Performed by: PHYSICIAN ASSISTANT

## 2024-08-12 PROCEDURE — 1160F RVW MEDS BY RX/DR IN RCRD: CPT | Performed by: PHYSICIAN ASSISTANT

## 2024-08-12 PROCEDURE — 1159F MED LIST DOCD IN RCRD: CPT | Performed by: PHYSICIAN ASSISTANT

## 2024-08-12 NOTE — PROGRESS NOTES
"Subjective   Patient ID: Peter Ghotra is a 69 y.o. right hand dominant male is being seen for orthopaedic evaluation today for right hip pain   Pain of the Right Hip (Reports pain for 4 months with no known injury. Reports a occasional sharp pain that causes him to fall.)       Patient presents as a new patient for the evaluation of right anterior lateral hip pain ongoing x 4 months.  No specific injury or trauma.  He reports an occasional sharp pain to the lateral aspect that will often cause his hip to \"give out\".  He has been evaluated by neurosurgery in the past for chronic axial low back pain but has never had any type of physical therapy or injection therapy.  There is occasional tingling to the right lower extremity but not constant.                                                   Past Medical History:   Diagnosis Date    Anxiety     Arthritis     Arthritis of back     Asthma     Cataract 2020    Chronic cough     \"smoker's cough\"    COPD (chronic obstructive pulmonary disease)     Diabetes mellitus     Hepatitis C     treated    Hip arthrosis     Low back pain 2017    Visual impairment     glasses    Wears dentures     uppers - instructed no adhesive DOS        Past Surgical History:   Procedure Laterality Date    CATARACT EXTRACTION W/ INTRAOCULAR LENS IMPLANT Right 03/13/2023    Procedure: CATARACT PHACO EXTRACTION WITH INTRAOCULAR LENS IMPLANT RIGHT;  Surgeon: Alesha Ennis MD;  Location: [facility] OR;  Service: Ophthalmology;  Laterality: Right;    COLONOSCOPY N/A 09/25/2023    Procedure: COLONOSCOPY with polypectomy;  Surgeon: Pavan Parrish MD;  Location: [facility] ENDOSCOPY;  Service: Gastroenterology;  Laterality: N/A;    EYE SURGERY  2023    HERNIA REPAIR  2023    INGUINAL HERNIA REPAIR Right 11/08/2023    Procedure: INGUINAL HERNIA REPAIR/femoral hernia repair;  Surgeon: Aubrey Borrego MD;  Location: [facility] OR;  Service: General;  Laterality: Right;       Family History   Problem " "Relation Age of Onset    Arthritis Father         Social History     Socioeconomic History    Marital status: Single   Tobacco Use    Smoking status: Every Day     Current packs/day: 1.00     Average packs/day: 1 pack/day for 15.0 years (15.0 ttl pk-yrs)     Types: Cigarettes    Smokeless tobacco: Never   Vaping Use    Vaping status: Never Used   Substance and Sexual Activity    Alcohol use: Yes     Alcohol/week: 2.0 standard drinks of alcohol     Types: 2 Cans of beer per week     Comment: 1 beer per day    Drug use: Never    Sexual activity: Not Currently     Partners: Female     Birth control/protection: None       No Known Allergies    Review of Systems   Musculoskeletal:  Positive for arthralgias (right hip).       Objective   /60 (BP Location: Left arm, Patient Position: Sitting, Cuff Size: Adult)   Ht 162.6 cm (64.02\")   Wt 68.6 kg (151 lb 3.2 oz)   BMI 25.94 kg/m²   Physical Exam  Vitals and nursing note reviewed.   Constitutional:       Appearance: Normal appearance.   Pulmonary:      Effort: Pulmonary effort is normal.   Musculoskeletal:      Right hip: Tenderness present. No deformity. Decreased range of motion. Normal strength.   Neurological:      Mental Status: He is alert and oriented to person, place, and time.       Right Hip Exam     Tenderness   The patient is experiencing tenderness in the lateral and anterior.    Range of Motion   Abduction:  35   Flexion:  80     Muscle Strength   Abduction: 4/5   Adduction: 4/5   Flexion: 4/5     Tests   LARY: positive  Wiley: positive    Other   Pulse: present          + TTP right IT band    Extremity DVT signs are negative on physical exam with negative Randy sign, no calf pain, no palpable cords, and no skin tone change   Neurologic Exam     Mental Status   Oriented to person, place, and time.            Assessment & Plan   Independent Review of Radiographic Studies:    X-ray of the right hip 2 view performed in our clinic independently reviewed " for the evaluation of pain.  No comparison films available for review.  No acute fracture or dislocation.  There is mild to moderate right hip joint narrowing    I personally reviewed the MRI images of the right hip for the evaluation of his hip pain.  I concur with radiology interpretation.  There is moderate right hip degenerative narrowing without evidence of OCD lesion.    Procedures  [] No procedures were performed in office today.    Diagnoses and all orders for this visit:    1. Primary osteoarthritis of right hip (Primary)  -     Ambulatory Referral to Physical Therapy for Evaluation & Treatment  -     [facility] Large Joint    2. Arthralgia of right hip  -     [facility] Large Joint    3. It band syndrome, right       Discussion of orthopedic goals  Orthopedic activities reviewed and patient expressed appreciation  Risk, benefits, and merits of treatment alternatives reviewed with the patient and questions answered  Ice, heat, and/or modalities as beneficial    Recommendations/Plan:  Exercise, medications, injections, other patient advice, and return appointment as noted.  Patient is encouraged to call or return for any issues or concerns.    I have also reviewed his prior neurosurgical office notes and per the neurosurgeons notes if there were no significant spondylolisthesis which there was not on his flexion-extension lumbar x-rays there was not much neurosurgery could offer for his low back issues    I would like for the patient to enroll in therapy and we will also schedule him for a fluoroscopy guided right hip injection. patient is content with the plan of care.    No follow-ups on file.  Patient agreeable to call or return sooner for any concerns.

## 2024-08-19 ENCOUNTER — PATIENT ROUNDING (BHMG ONLY) (OUTPATIENT)
Dept: ORTHOPEDIC SURGERY | Facility: CLINIC | Age: 69
End: 2024-08-19
Payer: MEDICARE

## 2024-08-19 NOTE — PROGRESS NOTES
August 19, 2024    Hello, may I speak with Peter Ghotra?    My name is Lindsay      I am  with [facility]  [address] [address] [phone].    Before we get started may I verify your date of birth? 1955    I am calling to officially welcome you to our practice and ask about your recent visit. Is this a good time to talk? No - voicemail not set up    Tell me about your visit with us. What things went well?          We're always looking for ways to make our patients' experiences even better. Do you have recommendations on ways we may improve?      Overall were you satisfied with your first visit to our practice?        I appreciate you taking the time to speak with me today. Is there anything else I can do for you?       Thank you, and have a great day.

## 2024-09-30 DIAGNOSIS — J43.1 PANLOBULAR EMPHYSEMA: ICD-10-CM

## 2024-09-30 DIAGNOSIS — E11.65 TYPE 2 DIABETES MELLITUS WITH HYPERGLYCEMIA, WITHOUT LONG-TERM CURRENT USE OF INSULIN: ICD-10-CM

## 2024-09-30 RX ORDER — ALBUTEROL SULFATE 90 UG/1
2 POWDER, METERED RESPIRATORY (INHALATION) EVERY 4 HOURS PRN
Qty: 2 EACH | Refills: 5 | Status: SHIPPED | OUTPATIENT
Start: 2024-09-30

## 2024-09-30 NOTE — TELEPHONE ENCOUNTER
Caller: Brandin Petermeghann Mtz    Relationship: Self    Best call back number: [phone]     Requested Prescriptions:   Requested Prescriptions     Pending Prescriptions Disp Refills    empagliflozin (Jardiance) 10 MG tablet tablet 90 tablet 1     Sig: Take 1 tablet by mouth Daily.    albuterol (ProAir RespiClick) 108 (90 Base) MCG/ACT inhaler 2 each 5     Sig: Inhale 2 puffs Every 4 (Four) Hours As Needed for Wheezing.        Pharmacy where request should be sent: [id number] - [place], [place] - [facility] [address] [facility] [address] - [phone]  - [phone] FX     Last office visit with prescribing clinician: 6/13/2024   Last telemedicine visit with prescribing clinician: Visit date not found   Next office visit with prescribing clinician: 10/14/2024     Additional details provided by patient: SHOULD PATIENT CONTINUE PROAIR INHALER?  PHONE CALL PLEASE.  PATIENT OUT OF MEDICATION.  WAS PATIENT TAKEN OFF PROAIR AND PUT ON ANORO?    Does the patient have less than a 3 day supply:  [x] Yes  [] No    Would you like a call back once the refill request has been completed: [x] Yes [] No    If the office needs to give you a call back, can they leave a voicemail: [x] Yes [] No    Karen Sanford   09/30/24 10:30 EDT

## 2024-10-14 ENCOUNTER — OFFICE VISIT (OUTPATIENT)
Dept: INTERNAL MEDICINE | Facility: CLINIC | Age: 69
End: 2024-10-14
Payer: MEDICARE

## 2024-10-14 VITALS
DIASTOLIC BLOOD PRESSURE: 68 MMHG | HEIGHT: 64 IN | HEART RATE: 62 BPM | SYSTOLIC BLOOD PRESSURE: 130 MMHG | RESPIRATION RATE: 16 BRPM | OXYGEN SATURATION: 98 % | BODY MASS INDEX: 26.12 KG/M2 | WEIGHT: 153 LBS

## 2024-10-14 DIAGNOSIS — K40.91 UNILATERAL RECURRENT INGUINAL HERNIA WITHOUT OBSTRUCTION OR GANGRENE: ICD-10-CM

## 2024-10-14 DIAGNOSIS — E11.65 TYPE 2 DIABETES MELLITUS WITH HYPERGLYCEMIA, WITHOUT LONG-TERM CURRENT USE OF INSULIN: ICD-10-CM

## 2024-10-14 DIAGNOSIS — F17.210 CIGARETTE NICOTINE DEPENDENCE WITHOUT COMPLICATION: Primary | ICD-10-CM

## 2024-10-14 DIAGNOSIS — M54.16 RIGHT LUMBAR RADICULOPATHY: ICD-10-CM

## 2024-10-14 DIAGNOSIS — J43.1 PANLOBULAR EMPHYSEMA: ICD-10-CM

## 2024-10-14 DIAGNOSIS — Z23 NEEDS FLU SHOT: ICD-10-CM

## 2024-10-14 LAB
EXPIRATION DATE: ABNORMAL
HBA1C MFR BLD: 7.9 % (ref 4.5–5.7)
Lab: ABNORMAL

## 2024-10-14 PROCEDURE — 99214 OFFICE O/P EST MOD 30 MIN: CPT | Performed by: STUDENT IN AN ORGANIZED HEALTH CARE EDUCATION/TRAINING PROGRAM

## 2024-10-14 PROCEDURE — 90662 IIV NO PRSV INCREASED AG IM: CPT | Performed by: STUDENT IN AN ORGANIZED HEALTH CARE EDUCATION/TRAINING PROGRAM

## 2024-10-14 PROCEDURE — 3051F HG A1C>EQUAL 7.0%<8.0%: CPT | Performed by: STUDENT IN AN ORGANIZED HEALTH CARE EDUCATION/TRAINING PROGRAM

## 2024-10-14 PROCEDURE — 1125F AMNT PAIN NOTED PAIN PRSNT: CPT | Performed by: STUDENT IN AN ORGANIZED HEALTH CARE EDUCATION/TRAINING PROGRAM

## 2024-10-14 PROCEDURE — G0008 ADMIN INFLUENZA VIRUS VAC: HCPCS | Performed by: STUDENT IN AN ORGANIZED HEALTH CARE EDUCATION/TRAINING PROGRAM

## 2024-10-14 PROCEDURE — 83036 HEMOGLOBIN GLYCOSYLATED A1C: CPT | Performed by: STUDENT IN AN ORGANIZED HEALTH CARE EDUCATION/TRAINING PROGRAM

## 2024-10-14 NOTE — ASSESSMENT & PLAN NOTE
Meloxicam  Stable on current medication and dosage. Will continue current management. Refill medication as necessary.

## 2024-10-14 NOTE — ASSESSMENT & PLAN NOTE
With wheezing and rhonchi today.  But no difficulty breathing.  Has been out of Anoro/Breo the past few weeks since pharmacy does not have it.  Will start Stiolto today, advised compliance.  Use albuterol as needed.  Will call pharmacy today and see what LAMA-LABA available.

## 2024-10-14 NOTE — PROGRESS NOTES
Office Note     Name: Peter Ghotra    : 1955     MRN: [id number]     Chief Complaint  Diabetes (A1C check)    Subjective     History of Present Illness:  Peter Ghotra is a 69 y.o. male who presents today for chronic condition    Dm type 2: A1c today 7.9%, on metformin 1000 twice a day, jardiance 10mg daily.   Inguinal hernia recently had surgery on 2023  COPD: no increase in work of breathing, compliant with breo and prn albuerol  Chronic hepatitis C: Stable     Current smoker: 50 years x 1/2 pack per day, 25-pack-year history. Decreased to less than half a pack a day  LDCT: last was 2023 lung nodules, repeat on 3/2024, ordered  Colonoscopy 2023, polyps, repeat in 3 years     Family History:   Family History   Problem Relation Age of Onset    Arthritis Father        Social History:   Social History     Socioeconomic History    Marital status: Single   Tobacco Use    Smoking status: Every Day     Current packs/day: 1.00     Average packs/day: 1 pack/day for 15.0 years (15.0 ttl pk-yrs)     Types: Cigarettes    Smokeless tobacco: Never   Vaping Use    Vaping status: Never Used   Substance and Sexual Activity    Alcohol use: Yes     Alcohol/week: 2.0 standard drinks of alcohol     Types: 2 Cans of beer per week     Comment: 1 beer per day    Drug use: Never    Sexual activity: Not Currently     Partners: Female     Birth control/protection: None       Health Maintenance   Topic Date Due    Hepatitis B (1 of 3 - Risk 3-dose series) Never done    INFLUENZA VACCINE  2024    DIABETIC EYE EXAM  2024    HEMOGLOBIN A1C  2024    ZOSTER VACCINE (1 of 2) 2025 (Originally 2005)    COVID-19 Vaccine (2023- season) 10/14/2025 (Originally 2024)    ANNUAL WELLNESS VISIT  2025    DIABETIC FOOT EXAM  2025    URINE MICROALBUMIN  2025    BMI FOLLOWUP  2025    TDAP/TD VACCINES (2 - Td or Tdap) 2027    COLORECTAL CANCER SCREENING   "09/25/2033    HEPATITIS C SCREENING  Completed    Pneumococcal Vaccine 65+  Completed    AAA SCREEN (ONE-TIME)  Completed       Objective     Vital Signs  /68   Pulse 62   Resp 16   Ht 162.6 cm (64.02\")   Wt 69.4 kg (153 lb)   SpO2 98%   BMI 26.25 kg/m²   Estimated body mass index is 26.25 kg/m² as calculated from the following:    Height as of this encounter: 162.6 cm (64.02\").    Weight as of this encounter: 69.4 kg (153 lb).        Physical Exam  Vitals and nursing note reviewed.   Constitutional:       Appearance: Normal appearance.   HENT:      Head: Normocephalic and atraumatic.   Cardiovascular:      Rate and Rhythm: Normal rate and regular rhythm.      Pulses: Normal pulses.      Heart sounds: Normal heart sounds.   Pulmonary:      Effort: Pulmonary effort is normal. No respiratory distress.      Breath sounds: Wheezing and rhonchi present. No rales.   Abdominal:      General: Abdomen is flat. Bowel sounds are normal.      Palpations: Abdomen is soft.      Tenderness: There is no abdominal tenderness. There is no guarding.   Musculoskeletal:      Cervical back: Neck supple.   Skin:     General: Skin is warm.      Capillary Refill: Capillary refill takes less than 2 seconds.   Neurological:      General: No focal deficit present.      Mental Status: He is alert. Mental status is at baseline.   Psychiatric:         Mood and Affect: Mood normal.         Behavior: Behavior normal.          Procedures     Assessment and Plan     Diagnoses and all orders for this visit:    1. Cigarette nicotine dependence without complication (Primary)  -      CT Chest Low Dose Cancer Screening WO; Future    2. Type 2 diabetes mellitus with hyperglycemia, without long-term current use of insulin  Assessment & Plan:  A1c still elevated at 7.9% increase Jardiance to 25 mg, continue metformin 1000 mg twice a day.  Close follow-up in 4 months repeat A1c     Orders:  -     empagliflozin (Jardiance) 25 MG tablet tablet; Take " 1 tablet by mouth Daily.  Dispense: 90 tablet; Refill: 1  -     metFORMIN (GLUCOPHAGE) 1000 MG tablet; Take 1 tablet by mouth 2 (Two) Times a Day With Meals.  Dispense: 180 tablet; Refill: 1    3. Unilateral recurrent inguinal hernia without obstruction or gangrene  Assessment & Plan:  Status post surgery 11/2023 stable at this time      4. Right lumbar radiculopathy  Assessment & Plan:  Meloxicam  Stable on current medication and dosage. Will continue current management. Refill medication as necessary.        5. Panlobular emphysema  Assessment & Plan:  With wheezing and rhonchi today.  But no difficulty breathing.  Has been out of Anoro/Breo the past few weeks since pharmacy does not have it.  Will start Stiolto today, advised compliance.  Use albuterol as needed.  Will call pharmacy today and see what LAMA-LABA available.        Orders:  -     tiotropium bromide-olodaterol (STIOLTO RESPIMAT) 2.5-2.5 MCG/ACT aerosol solution inhaler; Inhale 2 puffs Daily.  Dispense: 4 g; Refill: 5         Counseling was given to patient for the following topics: instructions for management, risks and benefits of treatment options, and importance of treatment compliance.    Follow Up  Return in about 4 months (around 2/21/2025) for Recheck a1c, Medicare Wellness.    MD UGO Heller [facility]  [address] [place] [address] [phone]

## 2024-10-14 NOTE — ASSESSMENT & PLAN NOTE
A1c still elevated at 7.9% increase Jardiance to 25 mg, continue metformin 1000 mg twice a day.  Close follow-up in 4 months repeat A1c

## 2024-10-17 ENCOUNTER — TELEPHONE (OUTPATIENT)
Dept: INTERNAL MEDICINE | Facility: CLINIC | Age: 69
End: 2024-10-17
Payer: MEDICARE

## 2024-10-17 NOTE — TELEPHONE ENCOUNTER
Caller: Peter Ghotra    Relationship to patient: Self    Best call back number: [phone]     Patient is needing: PATIENTS INSURANCE WILL NOT COVER THE INHALER, PHARMACY TOLD PATIENT THAT MORE INFORMATION WAS NEEDED. PLEASE ADVISE    [id number] - [place], [place] - [address] AT [facility] [address] - [phone]  - [phone] FX

## 2024-10-18 ENCOUNTER — PRIOR AUTHORIZATION (OUTPATIENT)
Dept: INTERNAL MEDICINE | Facility: CLINIC | Age: 69
End: 2024-10-18
Payer: MEDICARE

## 2024-10-18 NOTE — TELEPHONE ENCOUNTER
Stiolto Respimat 2.5-2.5MCG/ACT aerosol    Key: [id number]      Approved today by [employer] NCPDP 2017  Your request has been approved  Authorization Expiration Date: 12/31/2024

## 2025-01-14 ENCOUNTER — HOSPITAL ENCOUNTER (OUTPATIENT)
Dept: CT IMAGING | Facility: HOSPITAL | Age: 70
Discharge: HOME | End: 2025-01-14
Admitting: STUDENT IN AN ORGANIZED HEALTH CARE EDUCATION/TRAINING PROGRAM
Payer: MEDICARE

## 2025-01-14 DIAGNOSIS — F17.210 CIGARETTE NICOTINE DEPENDENCE WITHOUT COMPLICATION: ICD-10-CM

## 2025-01-14 PROCEDURE — 71271 CT THORAX LUNG CANCER SCR C-: CPT

## 2025-01-16 DIAGNOSIS — R91.1 LUNG NODULE: Primary | ICD-10-CM

## 2025-01-21 ENCOUNTER — TELEPHONE (OUTPATIENT)
Dept: INTERNAL MEDICINE | Facility: CLINIC | Age: 70
End: 2025-01-21

## 2025-01-21 ENCOUNTER — OFFICE VISIT (OUTPATIENT)
Dept: INTERNAL MEDICINE | Facility: CLINIC | Age: 70
End: 2025-01-21
Payer: MEDICARE

## 2025-01-21 VITALS
RESPIRATION RATE: 17 BRPM | WEIGHT: 148 LBS | OXYGEN SATURATION: 97 % | BODY MASS INDEX: 25.27 KG/M2 | DIASTOLIC BLOOD PRESSURE: 71 MMHG | HEIGHT: 64 IN | HEART RATE: 70 BPM | TEMPERATURE: 97.9 F | SYSTOLIC BLOOD PRESSURE: 132 MMHG

## 2025-01-21 DIAGNOSIS — R91.1 LUNG NODULE: ICD-10-CM

## 2025-01-21 DIAGNOSIS — R53.83 OTHER FATIGUE: ICD-10-CM

## 2025-01-21 DIAGNOSIS — L81.9 PIGMENTED SKIN LESION OF SUSPECTED MALIGNANT NATURE: Primary | ICD-10-CM

## 2025-01-21 DIAGNOSIS — E55.9 VITAMIN D DEFICIENCY, UNSPECIFIED: ICD-10-CM

## 2025-01-21 PROCEDURE — G2211 COMPLEX E/M VISIT ADD ON: HCPCS | Performed by: STUDENT IN AN ORGANIZED HEALTH CARE EDUCATION/TRAINING PROGRAM

## 2025-01-21 PROCEDURE — 99214 OFFICE O/P EST MOD 30 MIN: CPT | Performed by: STUDENT IN AN ORGANIZED HEALTH CARE EDUCATION/TRAINING PROGRAM

## 2025-01-21 PROCEDURE — 1125F AMNT PAIN NOTED PAIN PRSNT: CPT | Performed by: STUDENT IN AN ORGANIZED HEALTH CARE EDUCATION/TRAINING PROGRAM

## 2025-01-21 RX ORDER — EMPAGLIFLOZIN 10 MG/1
1 TABLET, FILM COATED ORAL DAILY
COMMUNITY
Start: 2024-12-30

## 2025-01-21 NOTE — ASSESSMENT & PLAN NOTE
Consider adding CT abdomen pelvis for fatigue and new lung lesion on low-dose CT.  Will obtain labs for now including electrolytes, CBC, thyroid and vitamin levels

## 2025-01-21 NOTE — TELEPHONE ENCOUNTER
Caller: Peter Ghotra    Relationship: Self    Best call back number:     What is the best time to reach you:     Who are you requesting to speak with (clinical staff, provider,  specific staff member): MD OR CLINICAL STAFF    Do you know the name of the person who called: UNKNOWN    What was the call regarding:     Is it okay if the provider responds through MyChart:

## 2025-01-21 NOTE — PROGRESS NOTES
Office Note     Name: Peter Ghotra    : 1955     MRN: [id number]     Chief Complaint  Mass (Left arm)    Subjective     History of Present Illness:  Peter Ghotra is a 69 y.o. male who presents today for skin lesion, fatigue and new lung nodule on scan.     New skin lesion has been there for the past 4 months, growing in size started out as 1 small papule.  Now more crusty and has red borders    Low-dose lung CT showed new lung nodule 4 mm, for follow-up chest CT in 6 months, no increased difficulty breathing. Also complains of fatigue, is able to go to work but feels more weak.      Family History:   Family History   Problem Relation Age of Onset   • Arthritis Father        Social History:   Social History     Socioeconomic History   • Marital status: Single   Tobacco Use   • Smoking status: Every Day     Current packs/day: 1.00     Average packs/day: 1 pack/day for 15.0 years (15.0 ttl pk-yrs)     Types: Cigarettes   • Smokeless tobacco: Never   Vaping Use   • Vaping status: Never Used   Substance and Sexual Activity   • Alcohol use: Yes     Alcohol/week: 2.0 standard drinks of alcohol     Types: 2 Cans of beer per week     Comment: 1 beer per day   • Drug use: Never   • Sexual activity: Not Currently     Partners: Female     Birth control/protection: None       Health Maintenance   Topic Date Due   • Hepatitis B (1 of 3 - Risk 3-dose series) Never done   • DIABETIC EYE EXAM  2024   • ANNUAL WELLNESS VISIT  2025   • URINE MICROALBUMIN  2025   • HEMOGLOBIN A1C  2025   • ZOSTER VACCINE (1 of 2) 2025 (Originally 2005)   • COVID-19 Vaccine (2024- season) 10/14/2025 (Originally 2024)   • BMI FOLLOWUP  2025   • TDAP/TD VACCINES (2 - Td or Tdap) 2027   • COLORECTAL CANCER SCREENING  2033   • HEPATITIS C SCREENING  Completed   • INFLUENZA VACCINE  Completed   • Pneumococcal Vaccine 65+  Completed   • AAA SCREEN ONCE  Completed  "      Patient Care Team:  Arely Garrett MD as PCP - General (Family Medicine)  Aubrey Borrego MD as Consulting Physician (General Surgery)    Objective     Vital Signs  /71   Pulse 70   Temp 97.9 °F (36.6 °C) (Infrared)   Resp 17   Ht 162.6 cm (64.02\")   Wt 67.1 kg (148 lb)   SpO2 97%   BMI 25.39 kg/m²   Estimated body mass index is 25.39 kg/m² as calculated from the following:    Height as of this encounter: 162.6 cm (64.02\").    Weight as of this encounter: 67.1 kg (148 lb).        Physical Exam  Vitals and nursing note reviewed.   Pulmonary:      Effort: Pulmonary effort is normal. No respiratory distress.   Skin:     Findings: Lesion (left arm, scaling with erythematous border) present.          Procedures     Assessment and Plan     Diagnoses and all orders for this visit:    1. Pigmented skin lesion of suspected malignant nature (Primary)  Assessment & Plan:  Concern for skin neoplasm  Refer to derm    Orders:  -     Ambulatory Referral to Dermatology    2. Other fatigue  Assessment & Plan:  Consider adding CT abdomen pelvis for fatigue and new lung lesion on low-dose CT.  Will obtain labs for now including electrolytes, CBC, thyroid and vitamin levels    Orders:  -     CBC (No Diff)  -     Basic Metabolic Panel  -     TSH Rfx On Abnormal To Free T4  -     Vitamin D,25-Hydroxy  -     Vitamin B12  -     CT abdomen pelvis w contrast; Future    3. Vitamin D deficiency, unspecified  -     Vitamin D,25-Hydroxy    4. Lung nodule  Assessment & Plan:  New lesion seen on recent low-dose lung CT will repeat in 6 months.  Will add CT abdomen pelvis with chest CT for fatigue    Orders:  -     CT abdomen pelvis w contrast; Future         Counseling was given to patient for the following topics: instructions for management and risk factor reductions.    Follow Up  Return in about 6 weeks (around 3/4/2025) for Medicare Wellness.    MD UGO Heller [facility] " [facility]  [address] [place] [address] [address]  [facility] [address] [phone]

## 2025-01-21 NOTE — ASSESSMENT & PLAN NOTE
New lesion seen on recent low-dose lung CT will repeat in 6 months.  Will add CT abdomen pelvis with chest CT for fatigue

## 2025-01-22 DIAGNOSIS — D58.2 ELEVATED HEMOGLOBIN: ICD-10-CM

## 2025-01-22 DIAGNOSIS — R71.8 ELEVATED HEMATOCRIT: ICD-10-CM

## 2025-01-22 DIAGNOSIS — R53.83 OTHER FATIGUE: ICD-10-CM

## 2025-01-22 DIAGNOSIS — D58.2 ELEVATED HEMOGLOBIN: Primary | ICD-10-CM

## 2025-01-22 DIAGNOSIS — R91.1 LUNG NODULE: Primary | ICD-10-CM

## 2025-01-22 DIAGNOSIS — R91.1 LUNG NODULE: ICD-10-CM

## 2025-01-22 LAB
25(OH)D3+25(OH)D2 SERPL-MCNC: 10.7 NG/ML (ref 30–100)
BUN SERPL-MCNC: 20 MG/DL (ref 8–23)
BUN/CREAT SERPL: 32.3 (ref 7–25)
CALCIUM SERPL-MCNC: 10.1 MG/DL (ref 8.6–10.5)
CHLORIDE SERPL-SCNC: 100 MMOL/L (ref 98–107)
CO2 SERPL-SCNC: 26.7 MMOL/L (ref 22–29)
CREAT SERPL-MCNC: 0.62 MG/DL (ref 0.76–1.27)
EGFRCR SERPLBLD CKD-EPI 2021: 103.5 ML/MIN/1.73
ERYTHROCYTE [DISTWIDTH] IN BLOOD BY AUTOMATED COUNT: 11.7 % (ref 12.3–15.4)
GLUCOSE SERPL-MCNC: 120 MG/DL (ref 65–99)
HCT VFR BLD AUTO: 52.9 % (ref 37.5–51)
HGB BLD-MCNC: 18.5 G/DL (ref 13–17.7)
MCH RBC QN AUTO: 33.7 PG (ref 26.6–33)
MCHC RBC AUTO-ENTMCNC: 35 G/DL (ref 31.5–35.7)
MCV RBC AUTO: 96.4 FL (ref 79–97)
PLATELET # BLD AUTO: 190 10*3/MM3 (ref 140–450)
POTASSIUM SERPL-SCNC: 5.1 MMOL/L (ref 3.5–5.2)
RBC # BLD AUTO: 5.49 10*6/MM3 (ref 4.14–5.8)
SODIUM SERPL-SCNC: 140 MMOL/L (ref 136–145)
TSH SERPL DL<=0.005 MIU/L-ACNC: 0.71 UIU/ML (ref 0.27–4.2)
VIT B12 SERPL-MCNC: 276 PG/ML (ref 211–946)
WBC # BLD AUTO: 7.49 10*3/MM3 (ref 3.4–10.8)

## 2025-01-22 NOTE — ADDENDUM NOTE
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

## 2025-02-06 LAB
BASOPHILS # BLD AUTO: 0.07 10*3/MM3 (ref 0–0.2)
BASOPHILS NFR BLD AUTO: 0.9 % (ref 0–1.5)
EOSINOPHIL # BLD AUTO: 0.12 10*3/MM3 (ref 0–0.4)
EOSINOPHIL NFR BLD AUTO: 1.6 % (ref 0.3–6.2)
ERYTHROCYTE [DISTWIDTH] IN BLOOD BY AUTOMATED COUNT: 11.8 % (ref 12.3–15.4)
HCT VFR BLD AUTO: 47.7 % (ref 37.5–51)
HGB BLD-MCNC: 16.8 G/DL (ref 13–17.7)
IMM GRANULOCYTES # BLD AUTO: 0.02 10*3/MM3 (ref 0–0.05)
IMM GRANULOCYTES NFR BLD AUTO: 0.3 % (ref 0–0.5)
LYMPHOCYTES # BLD AUTO: 1.91 10*3/MM3 (ref 0.7–3.1)
LYMPHOCYTES NFR BLD AUTO: 25.2 % (ref 19.6–45.3)
MCH RBC QN AUTO: 33.9 PG (ref 26.6–33)
MCHC RBC AUTO-ENTMCNC: 35.2 G/DL (ref 31.5–35.7)
MCV RBC AUTO: 96.2 FL (ref 79–97)
MONOCYTES # BLD AUTO: 0.62 10*3/MM3 (ref 0.1–0.9)
MONOCYTES NFR BLD AUTO: 8.2 % (ref 5–12)
NEUTROPHILS # BLD AUTO: 4.84 10*3/MM3 (ref 1.7–7)
NEUTROPHILS NFR BLD AUTO: 63.8 % (ref 42.7–76)
NRBC BLD AUTO-RTO: 0 /100 WBC (ref 0–0.2)
PLATELET # BLD AUTO: 185 10*3/MM3 (ref 140–450)
RBC # BLD AUTO: 4.96 10*6/MM3 (ref 4.14–5.8)
WBC # BLD AUTO: 7.58 10*3/MM3 (ref 3.4–10.8)

## 2025-02-25 ENCOUNTER — HOSPITAL ENCOUNTER (OUTPATIENT)
Dept: CT IMAGING | Facility: HOSPITAL | Age: 70
Discharge: HOME | End: 2025-02-25
Admitting: STUDENT IN AN ORGANIZED HEALTH CARE EDUCATION/TRAINING PROGRAM
Payer: MEDICARE

## 2025-02-25 DIAGNOSIS — R91.1 LUNG NODULE: ICD-10-CM

## 2025-02-25 DIAGNOSIS — R53.83 OTHER FATIGUE: ICD-10-CM

## 2025-02-25 DIAGNOSIS — R71.8 ELEVATED HEMATOCRIT: ICD-10-CM

## 2025-02-25 DIAGNOSIS — D58.2 ELEVATED HEMOGLOBIN: ICD-10-CM

## 2025-02-25 PROCEDURE — 25510000001 IOPAMIDOL 61 % SOLUTION: Performed by: STUDENT IN AN ORGANIZED HEALTH CARE EDUCATION/TRAINING PROGRAM

## 2025-02-25 PROCEDURE — 74178 CT ABD&PLV WO CNTR FLWD CNTR: CPT

## 2025-02-25 RX ORDER — IOPAMIDOL 612 MG/ML
100 INJECTION, SOLUTION INTRAVASCULAR
Status: COMPLETED | OUTPATIENT
Start: 2025-02-25 | End: 2025-02-25

## 2025-02-25 RX ADMIN — IOPAMIDOL 100 ML: 612 INJECTION, SOLUTION INTRAVENOUS at 11:58

## 2025-02-26 ENCOUNTER — TELEPHONE (OUTPATIENT)
Dept: INTERNAL MEDICINE | Facility: CLINIC | Age: 70
End: 2025-02-26
Payer: MEDICARE

## 2025-02-26 NOTE — TELEPHONE ENCOUNTER
"Relay     \"Fatty liver and diverticulosis which are outpocketings in your colon, at this time, there is no explanation for your weight loss in the CT scan of abdomen and pelvis \"                  "

## 2025-03-03 ENCOUNTER — TELEPHONE (OUTPATIENT)
Dept: INTERNAL MEDICINE | Facility: CLINIC | Age: 70
End: 2025-03-03
Payer: MEDICARE

## 2025-03-03 DIAGNOSIS — L60.2 LONG TOENAIL: Primary | ICD-10-CM

## 2025-03-03 NOTE — TELEPHONE ENCOUNTER
Caller: Peter Ghotra    Relationship: Self    Best call back number: [phone]     What is the medical concern/diagnosis: DIABETIC, NEEDS TOENAILS CLIPPED    What specialty or service is being requested: PODIATRY    What is the provider, practice or medical service name: ANY, [place]    What is the office location:     What is the office phone number:     Any additional details: PHONE CALL PLEASE

## 2025-03-08 DIAGNOSIS — J43.1 PANLOBULAR EMPHYSEMA: ICD-10-CM

## 2025-03-08 RX ORDER — TIOTROPIUM BROMIDE AND OLODATEROL 3.124; 2.736 UG/1; UG/1
2 SPRAY, METERED RESPIRATORY (INHALATION) DAILY
Qty: 4 G | Refills: 5 | Status: SHIPPED | OUTPATIENT
Start: 2025-03-08

## 2025-04-01 RX ORDER — EMPAGLIFLOZIN 10 MG/1
10 TABLET, FILM COATED ORAL DAILY
Qty: 90 TABLET | Refills: 1 | Status: SHIPPED | OUTPATIENT
Start: 2025-04-01

## 2025-04-11 DIAGNOSIS — E11.65 TYPE 2 DIABETES MELLITUS WITH HYPERGLYCEMIA, WITHOUT LONG-TERM CURRENT USE OF INSULIN: ICD-10-CM

## 2025-04-22 DIAGNOSIS — E11.65 TYPE 2 DIABETES MELLITUS WITH HYPERGLYCEMIA, WITHOUT LONG-TERM CURRENT USE OF INSULIN: ICD-10-CM

## 2025-04-22 RX ORDER — EMPAGLIFLOZIN 25 MG/1
25 TABLET, FILM COATED ORAL DAILY
Qty: 90 TABLET | Refills: 1 | OUTPATIENT
Start: 2025-04-22

## 2025-04-23 ENCOUNTER — OFFICE VISIT (OUTPATIENT)
Dept: INTERNAL MEDICINE | Facility: CLINIC | Age: 70
End: 2025-04-23
Payer: MEDICARE

## 2025-04-23 VITALS
WEIGHT: 139 LBS | DIASTOLIC BLOOD PRESSURE: 72 MMHG | RESPIRATION RATE: 18 BRPM | TEMPERATURE: 98.3 F | BODY MASS INDEX: 23.73 KG/M2 | SYSTOLIC BLOOD PRESSURE: 122 MMHG | HEIGHT: 64 IN | HEART RATE: 61 BPM | OXYGEN SATURATION: 97 %

## 2025-04-23 DIAGNOSIS — J43.1 PANLOBULAR EMPHYSEMA: ICD-10-CM

## 2025-04-23 DIAGNOSIS — R63.4 UNINTENTIONAL WEIGHT LOSS: ICD-10-CM

## 2025-04-23 DIAGNOSIS — B18.2 CHRONIC HEPATITIS C WITHOUT HEPATIC COMA: ICD-10-CM

## 2025-04-23 DIAGNOSIS — R91.1 LUNG NODULE: ICD-10-CM

## 2025-04-23 DIAGNOSIS — E11.65 TYPE 2 DIABETES MELLITUS WITH HYPERGLYCEMIA, WITHOUT LONG-TERM CURRENT USE OF INSULIN: Primary | ICD-10-CM

## 2025-04-23 PROBLEM — L81.9 PIGMENTED SKIN LESION OF SUSPECTED MALIGNANT NATURE: Status: RESOLVED | Noted: 2025-01-21 | Resolved: 2025-04-23

## 2025-04-23 PROBLEM — D58.2 ELEVATED HEMOGLOBIN: Status: RESOLVED | Noted: 2025-01-22 | Resolved: 2025-04-23

## 2025-04-23 PROBLEM — R71.8 ELEVATED HEMATOCRIT: Status: RESOLVED | Noted: 2025-01-22 | Resolved: 2025-04-23

## 2025-04-23 LAB
EXPIRATION DATE: ABNORMAL
HBA1C MFR BLD: 6.8 % (ref 4.5–5.7)
Lab: ABNORMAL

## 2025-04-23 NOTE — ASSESSMENT & PLAN NOTE
New 4mm nodule on 1/2025 CT chest, for repeat on 7/2025  With unintentional weight loss, will refer to pulmonology  Orders:    Ambulatory Referral to Pulmonology    CT Chest With Contrast; Future

## 2025-04-23 NOTE — ASSESSMENT & PLAN NOTE
Recheck Hepatitis panel  Refer to Hep C clinic  Orders:    Hepatitis panel, acute    Ambulatory Referral to Gastroenterology    Hepatitis C Virus (HCV) RNA, Diagnosis

## 2025-04-23 NOTE — ASSESSMENT & PLAN NOTE
A1c improved,   Stable on current medication and dosage. Will continue current management. Refill medication as necessary.    Orders:    POC Glycosylated Hemoglobin (Hb A1C)    empagliflozin (Jardiance) 10 MG tablet tablet; Take 1 tablet by mouth Daily.    metFORMIN (GLUCOPHAGE) 1000 MG tablet; Take 1 tablet by mouth 2 (Two) Times a Day With Meals.

## 2025-04-23 NOTE — ASSESSMENT & PLAN NOTE
Given abnormal physical exam and use of albuterol daily, will discontinue stiolto and start trelegy ellipta  Orders:    Fluticasone-Umeclidin-Vilant (TRELEGY) 100-62.5-25 MCG/ACT inhaler; Inhale 1 puff Daily.    Ambulatory Referral to Pulmonology

## 2025-04-23 NOTE — PROGRESS NOTES
Subjective   The ABCs of the Annual Wellness Visit  Medicare Wellness Visit      Peter Ghotra is a 69 y.o. patient who presents for a Medicare Wellness Visit.  Dm type 2: A1c today 7.9%, on metformin 1000 twice a day, jardiance 10mg daily.   Inguinal hernia recently had surgery on 11/8/2023  Emphysema: has cut down on smoking ciagrettes, down to 3 cigarettes a day. Using albuterol daily, stiolto helping  Chronic hepatitis C: Stable, claims he underwent treatment (unrecalled oral medications)     Current smoker: 50 years x 1/2 pack per day, 25-pack-year history. Decreased to 3 cigarettes a day  LDCT: last was 1/2025 New 4 mm right upper lobe nodule, for repeat on 7/2025  Colonoscopy 9/2023, polyps, repeat in 3 years    The following portions of the patient's history were reviewed and   updated as appropriate: allergies, current medications, past family history, past medical history, past social history, past surgical history, and problem list.    Compared to one year ago, the patient's physical   health is better.  Compared to one year ago, the patient's mental   health is better.    Recent Hospitalizations:  He was not admitted to the hospital during the last year.     Current Medical Providers:  Patient Care Team:  Arely Garrett MD as PCP - General (Family Medicine)  Aubrey Borrego MD as Consulting Physician (General Surgery)    Outpatient Medications Prior to Visit   Medication Sig Dispense Refill    albuterol (ProAir RespiClick) 108 (90 Base) MCG/ACT inhaler Inhale 2 puffs Every 4 (Four) Hours As Needed for Wheezing. 2 each 5    meloxicam (Mobic) 7.5 MG tablet Take 1 tablet by mouth Daily. 30 tablet 1    methocarbamol (ROBAXIN) 500 MG tablet Take 1 tablet by mouth 3 (Three) Times a Day. 30 tablet 0    empagliflozin (Jardiance) 10 MG tablet tablet TAKE 1 TABLET BY MOUTH DAILY 90 tablet 1    metFORMIN (GLUCOPHAGE) 1000 MG tablet TAKE 1 TABLET BY MOUTH TWICE DAILY WITH MEALS 180 tablet 1     "Stiolto Respimat 2.5-2.5 MCG/ACT aerosol solution inhaler INHALE 2 PUFFS BY MOUTH DAILY 4 g 5     No facility-administered medications prior to visit.     No opioid medication identified on active medication list. I have reviewed chart for other potential  high risk medication/s and harmful drug interactions in the elderly.      Aspirin is not on active medication list.  Aspirin use is not indicated based on review of current medical condition/s. Risk of harm outweighs potential benefits.  .    Patient Active Problem List   Diagnosis    Alopecia    Tobacco abuse    Chronic pain of right knee    Right lumbar radiculopathy    Panlobular emphysema    Vitamin D deficiency, unspecified    Chronic hepatitis C without hepatic coma    Lung nodule    Type 2 diabetes mellitus with hyperglycemia, without long-term current use of insulin    Unilateral recurrent inguinal hernia without obstruction or gangrene    Femoral hernia of right side    Restless legs syndrome    Hip arthrosis    Other fatigue    Unintentional weight loss     Advance Care Planning Advance Directive is not on file.  ACP discussion was held with the patient during this visit. Patient does not have an advance directive, information provided.            Objective   Vitals:    04/23/25 0816   BP: 122/72   Pulse: 61   Resp: 18   Temp: 98.3 °F (36.8 °C)   TempSrc: Infrared   SpO2: 97%   Weight: 63 kg (139 lb)   Height: 162.6 cm (64.02\")   PainSc: 5    PainLoc: Back       Estimated body mass index is 23.85 kg/m² as calculated from the following:    Height as of this encounter: 162.6 cm (64.02\").    Weight as of this encounter: 63 kg (139 lb).    BMI is within normal parameters. No other follow-up for BMI required.           Does the patient have evidence of cognitive impairment? No  Lab Results   Component Value Date    HGBA1C 6.8 (A) 04/23/2025                                                                                                Health  Risk " Assessment    Smoking Status:  Social History     Tobacco Use   Smoking Status Every Day    Current packs/day: 1.00    Average packs/day: 1 pack/day for 15.0 years (15.0 ttl pk-yrs)    Types: Cigarettes   Smokeless Tobacco Never     Alcohol Consumption:  Social History     Substance and Sexual Activity   Alcohol Use Yes    Alcohol/week: 2.0 standard drinks of alcohol    Types: 2 Cans of beer per week    Comment: 1 beer per day       Fall Risk Screen  NYDIAADI Fall Risk Assessment was completed, and patient is at LOW risk for falls.Assessment completed on:2025    Depression Screening   Little interest or pleasure in doing things? Not at all   Feeling down, depressed, or hopeless? Not at all   PHQ-2 Total Score 0      Health Habits and Functional and Cognitive Screenin/23/2025     8:15 AM   Functional & Cognitive Status   Do you have difficulty preparing food and eating? No   Do you have difficulty bathing yourself, getting dressed or grooming yourself? No   Do you have difficulty using the toilet? No   Do you have difficulty moving around from place to place? No   Do you have trouble with steps or getting out of a bed or a chair? No   Current Diet Frequent Junk Food   Dental Exam Up to date   Eye Exam Up to date   Exercise (times per week) 3 times per week   Current Exercises Include Walking   Do you need help using the phone?  No   Are you deaf or do you have serious difficulty hearing?  No   Do you need help to go to places out of walking distance? No   Do you need help shopping? No   Do you need help preparing meals?  No   Do you need help with housework?  No   Do you need help with laundry? No   Do you need help taking your medications? No   Do you need help managing money? No   Do you ever drive or ride in a car without wearing a seat belt? No   Have you felt unusual stress, anger or loneliness in the last month? No   Who do you live with? Spouse   If you need help, do you have trouble finding  someone available to you? No   Have you been bothered in the last four weeks by sexual problems? No   Do you have difficulty concentrating, remembering or making decisions? No           Age-appropriate Screening Schedule:  Refer to the list below for future screening recommendations based on patient's age, sex and/or medical conditions. Orders for these recommended tests are listed in the plan section. The patient has been provided with a written plan.    Health Maintenance List  Health Maintenance   Topic Date Due    URINE MICROALBUMIN-CREATININE RATIO (uACR)  Never done    Hepatitis B (1 of 3 - Risk 3-dose series) Never done    DIABETIC EYE EXAM  11/24/2024    COVID-19 Vaccine (5 - 2024-25 season) 10/14/2025 (Originally 9/1/2024)    ZOSTER VACCINE (1 of 2) 04/23/2026 (Originally 5/14/2005)    INFLUENZA VACCINE  07/01/2025    HEMOGLOBIN A1C  10/23/2025    DIABETIC FOOT EXAM  03/17/2026    ANNUAL WELLNESS VISIT  04/23/2026    TDAP/TD VACCINES (2 - Td or Tdap) 11/23/2027    COLORECTAL CANCER SCREENING  09/25/2033    HEPATITIS C SCREENING  Completed    Pneumococcal Vaccine 50+  Completed    AAA SCREEN ONCE  Completed                                                                                                                                                CMS Preventative Services Quick Reference  Risk Factors Identified During Encounter  Dental Screening Recommended  Vision Screening Recommended    The above risks/problems have been discussed with the patient.  Pertinent information has been shared with the patient in the After Visit Summary.  An After Visit Summary and PPPS were made available to the patient.    Follow Up:   Next Medicare Wellness visit to be scheduled in 1 year.         Additional E&M Note during same encounter follows:  Patient has additional, significant, and separately identifiable condition(s)/problem(s) that require work above and beyond the Medicare Wellness Visit     Chief  "Complaint  Medicare Wellness-subsequent (Annual Medicare Wellness Visit/)    Subjective   HPI  Peter is also being seen today for an annual adult preventative physical exam. Continued unintentional weight loss, he eats 5-6 times a day, works in a restaurant.  Does have hx of chronic hep C  LDCT on 1/2025 showed new lung nodule, for repeat on 7/2025  CT abd pelv normal                Objective   Vital Signs:  /72   Pulse 61   Temp 98.3 °F (36.8 °C) (Infrared)   Resp 18   Ht 162.6 cm (64.02\")   Wt 63 kg (139 lb)   SpO2 97%   BMI 23.85 kg/m²   Physical Exam  Vitals and nursing note reviewed.   Constitutional:       Appearance: Normal appearance.   HENT:      Head: Normocephalic and atraumatic.   Cardiovascular:      Rate and Rhythm: Normal rate and regular rhythm.      Pulses: Normal pulses.      Heart sounds: Normal heart sounds.   Pulmonary:      Effort: Pulmonary effort is normal. No respiratory distress.      Breath sounds: Wheezing present. No rhonchi or rales.   Abdominal:      General: Abdomen is flat. Bowel sounds are normal.      Palpations: Abdomen is soft.      Tenderness: There is no abdominal tenderness. There is no guarding.   Musculoskeletal:      Cervical back: Neck supple.   Skin:     General: Skin is warm.      Capillary Refill: Capillary refill takes less than 2 seconds.   Neurological:      General: No focal deficit present.      Mental Status: He is alert. Mental status is at baseline.   Psychiatric:         Mood and Affect: Mood normal.         Behavior: Behavior normal.                    Assessment and Plan      Type 2 diabetes mellitus with hyperglycemia, without long-term current use of insulin  A1c improved,   Stable on current medication and dosage. Will continue current management. Refill medication as necessary.    Orders:    POC Glycosylated Hemoglobin (Hb A1C)    empagliflozin (Jardiance) 10 MG tablet tablet; Take 1 tablet by mouth Daily.    metFORMIN (GLUCOPHAGE) 1000 MG " tablet; Take 1 tablet by mouth 2 (Two) Times a Day With Meals.    Lung nodule  New 4mm nodule on 1/2025 CT chest, for repeat on 7/2025  With unintentional weight loss, will refer to pulmonology  Orders:    Ambulatory Referral to Pulmonology    CT Chest With Contrast; Future    Panlobular emphysema  Given abnormal physical exam and use of albuterol daily, will discontinue stiolto and start trelegy ellipta  Orders:    Fluticasone-Umeclidin-Vilant (TRELEGY) 100-62.5-25 MCG/ACT inhaler; Inhale 1 puff Daily.    Ambulatory Referral to Pulmonology    Chronic hepatitis C without hepatic coma  Recheck Hepatitis panel  Refer to Hep C clinic  Orders:    Hepatitis panel, acute    Ambulatory Referral to Gastroenterology    Hepatitis C Virus (HCV) RNA, Diagnosis    Unintentional weight loss  discussed differentials including malignancy. Will obtain labs and refer to pulmonology and Hep C clinic for now.  For repeat CT Chest on 7/2025  Orders:    Hepatitis panel, acute    CBC & Differential    Comprehensive Metabolic Panel    Ambulatory Referral to Gastroenterology    Hepatitis C Antibody    Fluticasone-Umeclidin-Vilant (TRELEGY) 100-62.5-25 MCG/ACT inhaler; Inhale 1 puff Daily.    Ambulatory Referral to Pulmonology    Sedimentation rate, automated    C-reactive Protein    HIV-1 / O / 2 Ag / Antibody    Hepatitis C Virus (HCV) RNA, Diagnosis    CT Chest With Contrast; Future            Follow Up   Return in about 4 months (around 8/23/2025) for ffup chronic conditions.  Patient was given instructions and counseling regarding his condition or for health maintenance advice. Please see specific information pulled into the AVS if appropriate.

## 2025-04-23 NOTE — ASSESSMENT & PLAN NOTE
discussed differentials including malignancy. Will obtain labs and refer to pulmonology and Hep C clinic for now.  For repeat CT Chest on 7/2025  Orders:    Hepatitis panel, acute    CBC & Differential    Comprehensive Metabolic Panel    Ambulatory Referral to Gastroenterology    Hepatitis C Antibody    Fluticasone-Umeclidin-Vilant (TRELEGY) 100-62.5-25 MCG/ACT inhaler; Inhale 1 puff Daily.    Ambulatory Referral to Pulmonology    Sedimentation rate, automated    C-reactive Protein    HIV-1 / O / 2 Ag / Antibody    Hepatitis C Virus (HCV) RNA, Diagnosis    CT Chest With Contrast; Future

## 2025-04-25 ENCOUNTER — OFFICE VISIT (OUTPATIENT)
Dept: PULMONOLOGY | Facility: CLINIC | Age: 70
End: 2025-04-25
Payer: MEDICARE

## 2025-04-25 VITALS
RESPIRATION RATE: 18 BRPM | HEART RATE: 67 BPM | WEIGHT: 138 LBS | BODY MASS INDEX: 23.56 KG/M2 | HEIGHT: 64 IN | DIASTOLIC BLOOD PRESSURE: 64 MMHG | OXYGEN SATURATION: 95 % | SYSTOLIC BLOOD PRESSURE: 142 MMHG

## 2025-04-25 DIAGNOSIS — R91.1 LUNG NODULE SEEN ON IMAGING STUDY: Primary | ICD-10-CM

## 2025-04-25 DIAGNOSIS — R63.4 UNINTENTIONAL WEIGHT LOSS: ICD-10-CM

## 2025-04-25 DIAGNOSIS — J43.2 CENTRILOBULAR EMPHYSEMA: ICD-10-CM

## 2025-04-25 PROCEDURE — 99204 OFFICE O/P NEW MOD 45 MIN: CPT | Performed by: INTERNAL MEDICINE

## 2025-04-25 NOTE — PROGRESS NOTES
"     New Pulmonary Patient Office Visit      Patient Name: Peter Ghotra    Referring Physician: No ref. provider found    Chief Complaint:    Chief Complaint   Patient presents with    Breathing Problem    Consult       History of Present Illness: Peter Ghotra is a 69 y.o. male who is here today to establish care with Pulmonary.       Duration: COPD diagnosed in 2020  Severity: moderate shortness of breath  Associated Symptoms: ~15# weight loss over last 6 months, chronic cough mostly in the am, intermittent wheezing mostly in the am  Exercise Tolerance: able to walk on flat ground for > 2 city blocks  Timing: daily  Exacerbating Factors: exertion  Relieving Factors: rest and inhalers    Current Regimen:   Inhalers: Stiolto for 4 months and trying to get Trelegy covered by insurance, uses albuterol 2x per day  Nebs: No     Supplemental Oxygen: None  DME Company:     He is a retired  and works part time at [employer] as a  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   Respiratory:  Positive for cough, shortness of breath and wheezing.    Cardiovascular:  Negative for chest pain and leg swelling.   Musculoskeletal:  Positive for back pain.   Allergic/Immunologic: Negative for environmental allergies and food allergies.       Past Medical History:   Past Medical History:   Diagnosis Date    Anxiety     Arthritis     Arthritis of back     Asthma     Cataract 2020    Chronic cough     \"smoker's cough\"    COPD (chronic obstructive pulmonary disease)     Diabetes mellitus     Hepatitis C     treated    Hip arthrosis     Low back pain 2017    Visual impairment     glasses    Wears dentures     uppers - instructed no adhesive DOS       Past Surgical History:   Past Surgical History:   Procedure Laterality Date    CATARACT EXTRACTION W/ INTRAOCULAR LENS IMPLANT Right 03/13/2023    Procedure: CATARACT " PHACO EXTRACTION WITH INTRAOCULAR LENS IMPLANT RIGHT;  Surgeon: Alesha Ennis MD;  Location: [facility] OR;  Service: Ophthalmology;  Laterality: Right;    COLONOSCOPY N/A 09/25/2023    Procedure: COLONOSCOPY with polypectomy;  Surgeon: Pavan Parrish MD;  Location: [facility] ENDOSCOPY;  Service: Gastroenterology;  Laterality: N/A;    EYE SURGERY  2023    HERNIA REPAIR  2023    INGUINAL HERNIA REPAIR Right 11/08/2023    Procedure: INGUINAL HERNIA REPAIR/femoral hernia repair;  Surgeon: Aubrey Borrego MD;  Location: [facility] OR;  Service: General;  Laterality: Right;       Family History:   Family History   Problem Relation Age of Onset    Arthritis Father        Social History:   Social History     Socioeconomic History    Marital status: Single   Tobacco Use    Smoking status: Every Day     Average packs/day: 1.9 packs/day for 55.0 years (103.0 ttl pk-yrs)     Types: Cigarettes     Start date: 1970    Smokeless tobacco: Never   Vaping Use    Vaping status: Never Used   Substance and Sexual Activity    Alcohol use: Yes     Alcohol/week: 2.0 standard drinks of alcohol     Types: 2 Cans of beer per week     Comment: 1 beer per day    Drug use: Never    Sexual activity: Not Currently     Partners: Female     Birth control/protection: None       Medications:     Current Outpatient Medications:     albuterol (ProAir RespiClick) 108 (90 Base) MCG/ACT inhaler, Inhale 2 puffs Every 4 (Four) Hours As Needed for Wheezing., Disp: 2 each, Rfl: 5    empagliflozin (Jardiance) 10 MG tablet tablet, Take 1 tablet by mouth Daily., Disp: 90 tablet, Rfl: 1    Fluticasone-Umeclidin-Vilant (TRELEGY) 100-62.5-25 MCG/ACT inhaler, Inhale 1 puff Daily., Disp: 60 each, Rfl: 5    meloxicam (Mobic) 7.5 MG tablet, Take 1 tablet by mouth Daily., Disp: 30 tablet, Rfl: 1    metFORMIN (GLUCOPHAGE) 1000 MG tablet, Take 1 tablet by mouth 2 (Two) Times a Day With Meals., Disp: 180 tablet, Rfl: 1    methocarbamol (ROBAXIN) 500 MG tablet, Take 1  "tablet by mouth 3 (Three) Times a Day., Disp: 30 tablet, Rfl: 0    Allergies:   No Known Allergies    Objective     Physical Exam:  Vital Signs:   Vitals:    04/25/25 0913   BP: 142/64   Pulse: 67   Resp: 18   SpO2: 95%   Weight: 62.6 kg (138 lb)   Height: 162.6 cm (64.02\")   ============================  ============================    6 MINUTE WALK TEST    Peetr Ghotra   1955             BASELINE   SpO2%: 95 % RA    Heart Rate 67   Blood Pressure 142/64     EXERCISE SpO2% HEART RATE RA or O2 @ LPM   1 MINUTE 95 67 ra   2 MINUTES 94 92 ra   3 MINUTES 94 92 ra   4 MINUTES 92 76 ra   5 MINUTES 93 96 ra   6 MINUTES 93 90 ra   (Number of laps: 6 X 36 meters + Final partial lap: 0 meters = 216 meters)            Distance Walked:  216 Meters   SpO2% Post Exercise:  95 % RA   HR Post Exercise:  72     Reason to stop (if applicable):   ____ Chest Pain   ____ Light Headedness   ____ Dyspnea Unrelieved by Rest   ____ Abnormal Gait Pattern   ____ Severe Fatigue   ____ Other (Specify: __________________________)    Tech Comments (if any): none     Test performed by: RR    ============================  ============================     Physical Exam  Constitutional:       General: He is not in acute distress.     Appearance: He is well-developed. He is not ill-appearing.   HENT:      Head: Normocephalic and atraumatic.      Right Ear: External ear normal.      Left Ear: External ear normal.      Nose: Nose normal. No congestion or rhinorrhea.      Mouth/Throat:      Mouth: Mucous membranes are moist.      Pharynx: Oropharynx is clear. No oropharyngeal exudate or posterior oropharyngeal erythema.   Eyes:      General:         Right eye: No discharge.         Left eye: No discharge.      Extraocular Movements: Extraocular movements intact.      Conjunctiva/sclera: Conjunctivae normal.   Neck:      Trachea: No tracheal deviation.   Cardiovascular:      Rate and Rhythm: Normal rate and regular rhythm.   Pulmonary:      " "Effort: No accessory muscle usage or respiratory distress.      Breath sounds: Wheezing present.   Abdominal:      General: There is no distension.      Palpations: Abdomen is soft.   Musculoskeletal:         General: Normal range of motion.      Cervical back: Normal range of motion and neck supple.      Right lower leg: No edema.      Left lower leg: No edema.   Skin:     General: Skin is warm and dry.      Findings: No rash.   Neurological:      Mental Status: He is alert and oriented to person, place, and time.      Gait: Gait normal.   Psychiatric:         Mood and Affect: Mood normal.         Behavior: Behavior normal.         Thought Content: Thought content normal.         Judgment: Judgment normal.       Mallampati Score: II (hard and soft palate, upper portion of tonsils anduvula visible)    Results Review:   Labs: Reviewed.  Lab Results   Component Value Date    WBC 7.3 04/23/2025    HGB 17.8 (H) 04/23/2025    HCT 52.4 (H) 04/23/2025    MCV 99 (H) 04/23/2025     04/23/2025   Absolute eosinophils 200    Lab Results   Component Value Date    GLUCOSE 139 (H) 04/23/2025    BUN 15 04/23/2025    CREATININE 0.67 (L) 04/23/2025     04/23/2025    K 4.6 04/23/2025     04/23/2025    CALCIUM 9.6 04/23/2025    PROTEINTOT 6.6 04/23/2025    ALBUMIN 4.6 04/23/2025    ALT 18 04/23/2025    AST 15 04/23/2025    ALKPHOS 75 04/23/2025    BILITOT 0.9 04/23/2025    GLOB 2.0 04/23/2025    AGRATIO 2.4 02/08/2023    BCR 22 04/23/2025    ANIONGAP 9.3 11/02/2023    EGFR 101 04/23/2025       No results found for: \"CBCDIF\", \"CMP\"     Micro: As of April 25, 2025   No results found for: \"RESPCX\"  No results found for: \"BLOODCX\"  Lab Results   Component Value Date    URINECX Final report 06/04/2024     No results found for: \"MRSACX\"  No results found for: \"MRSAPCR\"  No results found for: \"URCX\"  No components found for: \"LOWRESPCF\"  No results found for: \"THROATCX\"  No results found for: \"CULTURES\"  No components found " "for: \"STREPBCX\"  No results found for: \"STREPPNEUAG\"  No results found for: \"LEGIONELLA\"  No results found for: \"MYCOPLASCX\"  No results found for: \"GCCX\"  No results found for: \"WOUNDCX\"  No results found for: \"BODYFLDCX\"    ABG: No results found for: \"PHART\", \"LYM3XVM\", \"PO2ART\", \"HGBBG\", \"L5JLYBNH\", \"CFIO2\", \"FCOHB\", \"CARBOXYHGB\", \"FMETHB\"    Echo:     Radiology Scans:   Last CT scan was reviewed in great detail with the patient. Images reviewed personally.     No results found for this or any previous visit.      No results found for this or any previous visit.      No results found for this or any previous visit.      Results for orders placed during the hospital encounter of 01/14/25    CT Chest Low Dose Cancer Screening WO    Narrative  PROCEDURE: CT CHEST LOW DOSE CANCER SCREENING WO-    HISTORY: ; F17.210-Nicotine dependence, cigarettes, uncomplicated,  69-year-old male current smoker with 40 pack year history of smoking.    TECHNIQUE: Axial images were obtained from the thoracic inlet through  the upper abdomen per the low-dose protocol. Coronal images were  reformatted. DLP 26.08 mGy.cm ; CTDI 0.67 mGy.    COMPARISON: February 14, 2023..    FINDINGS: There is centrilobular emphysema. There are bilateral  circumscribed, subcentimeter stable noncalcified pulmonary nodules.  Right upper lobe there is a 4 mm noncalcified nodule which is new, best  seen series 2 image 158. 6-month follow-up recommended to document  stability.. There is no airspace disease. The heart is normal in size.  The aorta is normal in caliber. There are no pleural or pericardial  effusions. The visualized upper abdomen is unremarkable. Bone windows  reveal no acute osseous abnormalities. Vascular calcifications noted.    Impression  Lung RADS category 3: Probably benign.    New 4 mm right upper lobe nodule.    RECOMMENDATIONS: 6-month follow-up low-dose chest CT.    This report was signed and finalized on 1/14/2025 8:14 AM by " Ivone Anderson MD.      No results found for this or any previous visit.      No results found for this or any previous visit.      No results found for this or any previous visit.      No results found for this or any previous visit.      No results found for this or any previous visit.      No results found for this or any previous visit.      No results found for this or any previous visit.      No results found for this or any previous visit.      No results found for this or any previous visit.      No results found for this or any previous visit.      No results found for this or any previous visit.      No results found for this or any previous visit.         PFT IMPRESSION:   None available for review    Assessment / Plan      Assessment/Plan:    1. Lung nodule seen on imaging study  New 4 mm lung nodule.  Discussed that plan is 6-month follow-up CT scan and will continue to monitor for 2 years to assess for stability.  Repeat CT scan ordered  - CT Chest Without Contrast; Future    2. Centrilobular emphysema  Advanced emphysema on imaging and suspect he has advanced COPD.  Check PFTs prior to next clinic visit.  6-minute walk showed no need for supplemental oxygen.    - Complete PFT - Pre & Post Bronchodilator; Future  - 6 Minute Walk Test; Future    3. Unintentional weight loss  Suspect that this is related to inadequately treated COPD.  Advised him to start Trelegy for triple therapy for his COPD.  Advised him that if insurance does not cover Trelegy to let me know then we would switch to Breztri or some alternative.    Encouraged him to eat 6 small meals.  I suspect he has some early satiety secondary to hyperinflation.    - Fluticasone-Umeclidin-Vilant (TRELEGY) 100-62.5-25 MCG/ACT inhaler; Inhale 1 puff Daily.  Dispense: 60 each; Refill: 5       Follow Up:   Return in about 3 months (around 7/25/2025) for PFT day of clinic appointment.    Malka Haywood MD  Pulmonary/Critical Care Physician    [facility]    This is electronically signed by Malka Haywood MD  04/25/2025 09:04 EDT       Please note that portions of this note may have been completed with a voice recognition program. Efforts were made to edit the dictations, but occasionally words are mistranscribed.

## 2025-04-26 LAB
ALBUMIN SERPL-MCNC: 4.6 G/DL (ref 3.9–4.9)
ALP SERPL-CCNC: 75 IU/L (ref 44–121)
ALT SERPL-CCNC: 18 IU/L (ref 0–44)
AST SERPL-CCNC: 15 IU/L (ref 0–40)
BASOPHILS # BLD AUTO: 0.1 X10E3/UL (ref 0–0.2)
BASOPHILS NFR BLD AUTO: 1 %
BILIRUB SERPL-MCNC: 0.9 MG/DL (ref 0–1.2)
BUN SERPL-MCNC: 15 MG/DL (ref 8–27)
BUN/CREAT SERPL: 22 (ref 10–24)
CALCIUM SERPL-MCNC: 9.6 MG/DL (ref 8.6–10.2)
CHLORIDE SERPL-SCNC: 100 MMOL/L (ref 96–106)
CO2 SERPL-SCNC: 25 MMOL/L (ref 20–29)
CREAT SERPL-MCNC: 0.67 MG/DL (ref 0.76–1.27)
CRP SERPL-MCNC: <1 MG/L (ref 0–10)
DIAGNOSTIC IMP SPEC-IMP: NORMAL
EGFRCR SERPLBLD CKD-EPI 2021: 101 ML/MIN/1.73
EOSINOPHIL # BLD AUTO: 0.2 X10E3/UL (ref 0–0.4)
EOSINOPHIL NFR BLD AUTO: 2 %
ERYTHROCYTE [DISTWIDTH] IN BLOOD BY AUTOMATED COUNT: 11.8 % (ref 11.6–15.4)
ERYTHROCYTE [SEDIMENTATION RATE] IN BLOOD BY WESTERGREN METHOD: 2 MM/HR (ref 0–30)
GLOBULIN SER CALC-MCNC: 2 G/DL (ref 1.5–4.5)
GLUCOSE SERPL-MCNC: 139 MG/DL (ref 70–99)
HAV IGM SERPL QL IA: NEGATIVE
HBV CORE IGM SERPL QL IA: NEGATIVE
HBV SURFACE AG SERPL QL IA: NEGATIVE
HCT VFR BLD AUTO: 52.4 % (ref 37.5–51)
HCV AB SERPL QL IA: REACTIVE
HCV IGG SERPL QL IA: NORMAL
HCV RNA SERPL NAA+PROBE-ACNC: NORMAL IU/ML
HCV RNA SERPL NAA+PROBE-ACNC: NORMAL IU/ML
HGB BLD-MCNC: 17.8 G/DL (ref 13–17.7)
HIV 1+2 AB+HIV1 P24 AG SERPL QL IA: NON REACTIVE
IMM GRANULOCYTES # BLD AUTO: 0 X10E3/UL (ref 0–0.1)
IMM GRANULOCYTES NFR BLD AUTO: 0 %
LYMPHOCYTES # BLD AUTO: 1.7 X10E3/UL (ref 0.7–3.1)
LYMPHOCYTES NFR BLD AUTO: 23 %
MCH RBC QN AUTO: 33.6 PG (ref 26.6–33)
MCHC RBC AUTO-ENTMCNC: 34 G/DL (ref 31.5–35.7)
MCV RBC AUTO: 99 FL (ref 79–97)
MONOCYTES # BLD AUTO: 0.7 X10E3/UL (ref 0.1–0.9)
MONOCYTES NFR BLD AUTO: 9 %
NEUTROPHILS # BLD AUTO: 4.8 X10E3/UL (ref 1.4–7)
NEUTROPHILS NFR BLD AUTO: 65 %
PLATELET # BLD AUTO: 204 X10E3/UL (ref 150–450)
POTASSIUM SERPL-SCNC: 4.6 MMOL/L (ref 3.5–5.2)
PROT SERPL-MCNC: 6.6 G/DL (ref 6–8.5)
RBC # BLD AUTO: 5.29 X10E6/UL (ref 4.14–5.8)
REF LAB TEST REF RANGE: NORMAL
SODIUM SERPL-SCNC: 141 MMOL/L (ref 134–144)
TEST INFORMATION: NORMAL
WBC # BLD AUTO: 7.3 X10E3/UL (ref 3.4–10.8)

## 2025-04-28 ENCOUNTER — PATIENT ROUNDING (BHMG ONLY) (OUTPATIENT)
Dept: PULMONOLOGY | Facility: CLINIC | Age: 70
End: 2025-04-28
Payer: MEDICARE

## 2025-05-28 ENCOUNTER — TELEPHONE (OUTPATIENT)
Dept: INTERNAL MEDICINE | Facility: CLINIC | Age: 70
End: 2025-05-28
Payer: MEDICARE

## 2025-05-28 DIAGNOSIS — E11.65 TYPE 2 DIABETES MELLITUS WITH HYPERGLYCEMIA, WITHOUT LONG-TERM CURRENT USE OF INSULIN: Primary | ICD-10-CM

## 2025-05-28 RX ORDER — ATORVASTATIN CALCIUM 20 MG/1
20 TABLET, FILM COATED ORAL DAILY
Qty: 90 TABLET | Refills: 1 | Status: SHIPPED | OUTPATIENT
Start: 2025-05-28

## 2025-05-28 NOTE — TELEPHONE ENCOUNTER
Caller: [id number] - [place] - [address] AT [facility] [address] - [phone]  - [phone] FX    Relationship: Pharmacy    Best call back number: [phone]     What are your concerns: PHARMACY STATES THAT PATIENT'S INSURANCE WOULD LIKE HIM TO BE ON STATIN THERAPY FOR DIABETES. IF PROVIDER AGREES, PLEASE SEND IN SCRIPT. IF PROVIDER DISAGREES, PLEASE CALL PHARMACY AND LET THEM KNOW SO THEY CAN NOTE IT IN PATIENT'S CHART.

## 2025-06-18 ENCOUNTER — APPOINTMENT (OUTPATIENT)
Dept: GENERAL RADIOLOGY | Facility: HOSPITAL | Age: 70
End: 2025-06-18
Payer: MEDICARE

## 2025-06-18 ENCOUNTER — HOSPITAL ENCOUNTER (EMERGENCY)
Facility: HOSPITAL | Age: 70
Discharge: HOME | End: 2025-06-18
Attending: EMERGENCY MEDICINE
Payer: MEDICARE

## 2025-06-18 VITALS
WEIGHT: 146.4 LBS | TEMPERATURE: 97.9 F | RESPIRATION RATE: 18 BRPM | DIASTOLIC BLOOD PRESSURE: 66 MMHG | BODY MASS INDEX: 25 KG/M2 | OXYGEN SATURATION: 96 % | SYSTOLIC BLOOD PRESSURE: 137 MMHG | HEIGHT: 64 IN | HEART RATE: 66 BPM

## 2025-06-18 DIAGNOSIS — S92.411B: Primary | ICD-10-CM

## 2025-06-18 PROCEDURE — 99283 EMERGENCY DEPT VISIT LOW MDM: CPT | Performed by: EMERGENCY MEDICINE

## 2025-06-18 PROCEDURE — 73630 X-RAY EXAM OF FOOT: CPT

## 2025-06-18 RX ORDER — CEPHALEXIN 500 MG/1
500 CAPSULE ORAL 2 TIMES DAILY
Qty: 5 CAPSULE | Refills: 0 | Status: SHIPPED | OUTPATIENT
Start: 2025-06-18 | End: 2025-06-23

## 2025-06-18 RX ORDER — HYDROCODONE BITARTRATE AND ACETAMINOPHEN 5; 325 MG/1; MG/1
1 TABLET ORAL EVERY 6 HOURS PRN
Qty: 3 TABLET | Refills: 0 | Status: SHIPPED | OUTPATIENT
Start: 2025-06-18 | End: 2025-06-25

## 2025-06-18 RX ORDER — ACETAMINOPHEN 325 MG/1
975 TABLET ORAL ONCE
Status: COMPLETED | OUTPATIENT
Start: 2025-06-18 | End: 2025-06-18

## 2025-06-18 RX ADMIN — ACETAMINOPHEN 975 MG: 325 TABLET ORAL at 07:00

## 2025-06-18 NOTE — DISCHARGE INSTRUCTIONS
If you notice any concerning symptoms, please return to the ER immediately. These can include but are not limited to: worsening of you condition, fevers, chills, shortness of breath, vomiting, weakness of the extremities, changes in your mental status, numbness, pale extremities, or chest pain.     Take medications as prescribed, your pharmacist may have additional recommendations concerning these medications. If you are given an antibiotic, then make sure you get the prescription filled and take the antibiotics until finished, this is important to prevent antibiotic resistant diseases.  Drink plenty of water while taking the antibiotics.  Avoid drinking alcohol or drinks that have caffeine in it while taking antibiotics.      For pain use ibuprofen (Motrin) or acetaminophen (Tylenol), unless prescribed medications that also contain these medications.  You can take over the counter acetaminophen or ibuprofen, please follow the directions as dosages differ. Do not take ibuprofen if you have a history of peptic ulcers, kidney disease, bariatric surgery, or are currently pregnant.  Do not take Tylenol if you have a history of liver disease or alcohol use disorder.        THANK YOU!!! From [facility]    On behalf of the Emergency Department staff at [facility], I would like to thank you for giving us the opportunity to address your health care needs and concerns. We hope that during your visit, our service was delivered in a professional and caring manner. Please keep [facility] in mind as we walk with you down the path to your own personal wellness. Please expect follow-up phone calls concerning additional care and questions about your experience.      You have received additional information specific to your diagnosis in these discharge instructions, please read these fully.  Anytime you have been seen in the emergency department we recommend close follow up with your  primary care provider or specialist, please follow these directions as indicated.

## 2025-06-18 NOTE — ED PROVIDER NOTES
"     [facility] EMERGENCY DEPARTMENT  Emergency Department Encounter  Emergency Medicine Physician Note     Pt Name:Peter Ghotra  MRN: [id number]  Birthdate 1955  Date of evaluation: 6/18/2025  PCP:  Arely Garrett MD  Note Started: 7:16 AM EDT      CHIEF COMPLAINT       Chief Complaint   Patient presents with    Toe Injury       HISTORY OF PRESENT ILLNESS  (Location/Symptom, Timing/Onset, Context/Setting, Quality, Duration, Modifying Factors, Severity.)      Peter Ghotra is a 70 y.o. male who presents with right great toe pain after having a glass table dropped on it.  Patient denies any laceration from the table.  Patient describes pain with movement of the fluid.  Patient states he did eat yesterday.  Patient denies any previous traumatic injury to the foot.    PAST MEDICAL / SURGICAL / SOCIAL / FAMILY HISTORY     Past Medical History:   Diagnosis Date    Anxiety     Arthritis     Arthritis of back     Asthma     Cataract 2020    Chronic cough     \"smoker's cough\"    COPD (chronic obstructive pulmonary disease)     Diabetes mellitus     Hepatitis C     treated    Hip arthrosis     Low back pain 2017    Visual impairment     glasses    Wears dentures     uppers - instructed no adhesive DOS     No additional pertinent       Past Surgical History:   Procedure Laterality Date    CATARACT EXTRACTION W/ INTRAOCULAR LENS IMPLANT Right 03/13/2023    Procedure: CATARACT PHACO EXTRACTION WITH INTRAOCULAR LENS IMPLANT RIGHT;  Surgeon: Alesha Ennis MD;  Location: [facility] OR;  Service: Ophthalmology;  Laterality: Right;    COLONOSCOPY N/A 09/25/2023    Procedure: COLONOSCOPY with polypectomy;  Surgeon: Pavan Parrish MD;  Location: [facility] ENDOSCOPY;  Service: Gastroenterology;  Laterality: N/A;    EYE SURGERY  2023    HERNIA REPAIR  2023    INGUINAL HERNIA REPAIR Right 11/08/2023    Procedure: INGUINAL HERNIA REPAIR/femoral hernia repair;  Surgeon: Aubrey Borrego MD;  " Location: [facility] OR;  Service: General;  Laterality: Right;     No additional pertinent       Social History     Socioeconomic History    Marital status: Single   Tobacco Use    Smoking status: Every Day     Average packs/day: 1.9 packs/day for 55.0 years (103.0 ttl pk-yrs)     Types: Cigarettes     Start date: 1970    Smokeless tobacco: Never   Vaping Use    Vaping status: Never Used   Substance and Sexual Activity    Alcohol use: Yes     Alcohol/week: 2.0 standard drinks of alcohol     Types: 2 Cans of beer per week     Comment: 1 beer per day    Drug use: Never    Sexual activity: Not Currently     Partners: Female     Birth control/protection: None       Family History   Problem Relation Age of Onset    Arthritis Father        Allergies:  Patient has no known allergies.    Home Medications:  Prior to Admission medications    Medication Sig Start Date End Date Taking? Authorizing Provider   albuterol (ProAir RespiClick) 108 (90 Base) MCG/ACT inhaler Inhale 2 puffs Every 4 (Four) Hours As Needed for Wheezing. 9/30/24   Arely Garrett MD   atorvastatin (LIPITOR) 20 MG tablet Take 1 tablet by mouth Daily. 5/28/25   Arely Garrett MD   empagliflozin (Jardiance) 10 MG tablet tablet Take 1 tablet by mouth Daily. 4/23/25   Arely Garrett MD   Fluticasone-Umeclidin-Vilant (TRELEGY) 100-62.5-25 MCG/ACT inhaler Inhale 1 puff Daily. 4/25/25   Malka Haywood MD   meloxicam (Mobic) 7.5 MG tablet Take 1 tablet by mouth Daily. 6/13/24   Arely Garrett MD   metFORMIN (GLUCOPHAGE) 1000 MG tablet Take 1 tablet by mouth 2 (Two) Times a Day With Meals. 4/23/25   Arely Garrett MD   methocarbamol (ROBAXIN) 500 MG tablet Take 1 tablet by mouth 3 (Three) Times a Day. 6/4/24   Arely Garrett MD         REVIEW OF SYSTEMS       Review of Systems   Musculoskeletal:  Positive for arthralgias.   Skin:  Positive for color change (Black and blue right great toe).       PHYSICAL EXAM      INITIAL  "VITALS:   /66 (BP Location: Left arm, Patient Position: Sitting)   Pulse 66   Temp 97.9 °F (36.6 °C) (Oral)   Resp 18   Ht 162.6 cm (64\")   Wt 66.4 kg (146 lb 6.4 oz)   SpO2 96%   BMI 25.13 kg/m²     Physical Exam  Constitutional:       Appearance: Normal appearance.   HENT:      Head: Normocephalic and atraumatic.   Musculoskeletal:      Comments: Hematoma noted on second digit, ecchymosis and bruising and tenderness to palpation of the first digit.   Neurological:      General: No focal deficit present.      Mental Status: He is alert and oriented to person, place, and time.   Psychiatric:         Mood and Affect: Mood normal.         Behavior: Behavior normal.           DDX/DIAGNOSTIC RESULTS / EMERGENCY DEPARTMENT COURSE / MDM     Differential Diagnosis included but not limited: Toe fracture, patient with small wound by the nail, concern for open toe fracture.    Diagnoses Considered but Do Not Suspect: Severe open fracture, severe infectious processes,changes motor pulses and sensation    Decision Rules/Scores utilized: N/A     Tests considered but not ordered and why:  N/A     MIPS: N/A     Code Status Discussion:  Not Discussed    Additional Patient Education Provided: None     Medical Decision Making    Medical Decision Making  Patient presenting with traumatic injury to the great toe.  Patient noted to have a fracture on the great toe.  Patient had scab by the toenail, concern for possible open fracture.  Do not feel that this is any severe open fracture at this time and not requiring any additional antibiotic management other than cephalexin.  Patient instructed to follow-up with podiatry and for closer evaluation for possible infectious development.  Patient given Ortho boot.  Patient able to weight-bear as tolerated.  Do feel patient stable for discharge at this time with fracture phalanx, close follow-up with podiatry, instructed return for any worsening signs of infection, worsening pain, " "or any other concerns.    Problems Addressed:  Displaced fracture of proximal phalanx of right great toe, initial encounter for open fracture: complicated acute illness or injury    Amount and/or Complexity of Data Reviewed  Radiology: ordered. Decision-making details documented in ED Course.    Risk  OTC drugs.  Prescription drug management.        See ED COURSE for additional MDM statements    EKG  None Performed     All EKG's are interpreted by the Emergency Department Physician who either signs or Co-signs this chart in the absence of a cardiologist.    Additional Scores                   EMERGENCY DEPARTMENT COURSE:    ED Course as of 06/18/25 1508   Wed Jun 18, 2025   0706 Personally evaluated x-ray imaging, patient noted to have distal phalangeal fracture of the first toe [CR]      ED Course User Index  [CR] Froilan Reed, DO       PROCEDURES:  None Performed   Procedures    DATA FOR LAB AND RADIOLOGY TESTS ORDERED BELOW ARE REVIEWED BY THE ED CLINICIAN:    RADIOLOGY: All x-rays, CT, MRI, and formal ultrasound images (except ED bedside ultrasound) are read by the radiologist, see reports below, unless otherwise noted in MDM or here.  Reports below are reviewed by myself.  XR Foot 3+ View Right   Final Result   1. Mildly comminuted and minimally displaced fracture of the proximal   right first distal phalange, as described.       This report was signed and finalized on 6/18/2025 7:33 AM by Westley Head MD.              LABS: Lab orders shown below, the results are reviewed by myself, and all abnormals are listed below.  Labs Reviewed - No data to display    Vitals Reviewed:    Vitals:    06/18/25 0627   BP: 137/66   BP Location: Left arm   Patient Position: Sitting   Pulse: 66   Resp: 18   Temp: 97.9 °F (36.6 °C)   TempSrc: Oral   SpO2: 96%   Weight: 66.4 kg (146 lb 6.4 oz)   Height: 162.6 cm (64\")       MEDICATIONS GIVEN TO PATIENT THIS ENCOUNTER:  Medications   acetaminophen (TYLENOL) tablet " 975 mg (975 mg Oral Given 6/18/25 0700)       CONSULTS:  None    CRITICAL CARE:  There was significant risk of life threatening deterioration of patient's condition requiring my direct management. Critical care time 0 minutes, excluding any documented procedures.    FINAL IMPRESSION      1. Displaced fracture of proximal phalanx of right great toe, initial encounter for open fracture          DISPOSITION / PLAN     ED Disposition       ED Disposition   Discharge    Condition   Stable    Comment   --               PATIENT REFERRED TO:  Arely Garrett MD  [address]  [facility] [address]  [phone]    Schedule an appointment as soon as possible for a visit in 1 week      Ale Lagunas DPM  [address] [place] [address]  [phone]    Schedule an appointment as soon as possible for a visit in 3 days      Ale Lagunas DPM  [address] [place] [address]  [phone]            DISCHARGE MEDICATIONS:     Medication List        START taking these medications      cephalexin 500 MG capsule  Commonly known as: KEFLEX  Take 1 capsule by mouth 2 (Two) Times a Day for 5 days.     HYDROcodone-acetaminophen 5-325 MG per tablet  Commonly known as: NORCO  Take 1 tablet by mouth Every 6 (Six) Hours As Needed for Severe Pain.            CONTINUE taking these medications      atorvastatin 20 MG tablet  Commonly known as: LIPITOR  Take 1 tablet by mouth Daily.     empagliflozin 10 MG tablet tablet  Commonly known as: Jardiance  Take 1 tablet by mouth Daily.     Fluticasone-Umeclidin-Vilant 100-62.5-25 MCG/ACT inhaler  Commonly known as: TRELEGY  Inhale 1 puff Daily.     meloxicam 7.5 MG tablet  Commonly known as: Mobic  Take 1 tablet by mouth Daily.     metFORMIN 1000 MG tablet  Commonly known as: GLUCOPHAGE  Take 1 tablet by mouth 2 (Two) Times a Day With Meals.     methocarbamol 500 MG tablet  Commonly known as: ROBAXIN  Take 1 tablet by mouth 3 (Three) Times a Day.     ProAir  RespiClick 108 (90 Base) MCG/ACT inhaler  Generic drug: albuterol  Inhale 2 puffs Every 4 (Four) Hours As Needed for Wheezing.               Where to Get Your Medications        These medications were sent to [id number] - [place], [place] - [address] AT [facility] [address] - [phone] PH - [phone] FX  [address], [facility] [address]      Phone: [phone]   cephalexin 500 MG capsule  HYDROcodone-acetaminophen 5-325 MG per tablet         Electronically signed by Froilan Reed DO, 06/18/25, 7:16 AM EDT.    Emergency Medicine Physician  [facility]  (Please note that portions of thisnote were completed with a voice recognition program.  Efforts were made to edit the dictations but occasionally words are mis-transcribed.)           Froilan Reed DO  06/18/25 3890

## 2025-06-25 ENCOUNTER — OFFICE VISIT (OUTPATIENT)
Dept: INTERNAL MEDICINE | Facility: CLINIC | Age: 70
End: 2025-06-25
Payer: MEDICARE

## 2025-06-25 VITALS
OXYGEN SATURATION: 95 % | TEMPERATURE: 98.7 F | BODY MASS INDEX: 23.9 KG/M2 | HEIGHT: 64 IN | SYSTOLIC BLOOD PRESSURE: 121 MMHG | DIASTOLIC BLOOD PRESSURE: 68 MMHG | WEIGHT: 140 LBS | RESPIRATION RATE: 20 BRPM | HEART RATE: 69 BPM

## 2025-06-25 DIAGNOSIS — D58.2 ELEVATED HEMOGLOBIN: ICD-10-CM

## 2025-06-25 DIAGNOSIS — E11.65 TYPE 2 DIABETES MELLITUS WITH HYPERGLYCEMIA, WITHOUT LONG-TERM CURRENT USE OF INSULIN: ICD-10-CM

## 2025-06-25 DIAGNOSIS — S92.911D: Primary | ICD-10-CM

## 2025-06-25 LAB
BASOPHILS # BLD AUTO: 0.06 10*3/MM3 (ref 0–0.2)
BASOPHILS NFR BLD AUTO: 0.7 % (ref 0–1.5)
EOSINOPHIL # BLD AUTO: 0.18 10*3/MM3 (ref 0–0.4)
EOSINOPHIL NFR BLD AUTO: 2 % (ref 0.3–6.2)
ERYTHROCYTE [DISTWIDTH] IN BLOOD BY AUTOMATED COUNT: 11.7 % (ref 12.3–15.4)
EXPIRATION DATE: NORMAL
HCT VFR BLD AUTO: 46.3 % (ref 37.5–51)
HGB BLD-MCNC: 15.8 G/DL (ref 13–17.7)
IMM GRANULOCYTES # BLD AUTO: 0.05 10*3/MM3 (ref 0–0.05)
IMM GRANULOCYTES NFR BLD AUTO: 0.5 % (ref 0–0.5)
LYMPHOCYTES # BLD AUTO: 1.64 10*3/MM3 (ref 0.7–3.1)
LYMPHOCYTES NFR BLD AUTO: 18 % (ref 19.6–45.3)
Lab: NORMAL
MCH RBC QN AUTO: 33.7 PG (ref 26.6–33)
MCHC RBC AUTO-ENTMCNC: 34.1 G/DL (ref 31.5–35.7)
MCV RBC AUTO: 98.7 FL (ref 79–97)
MONOCYTES # BLD AUTO: 0.83 10*3/MM3 (ref 0.1–0.9)
MONOCYTES NFR BLD AUTO: 9.1 % (ref 5–12)
NEUTROPHILS # BLD AUTO: 6.36 10*3/MM3 (ref 1.7–7)
NEUTROPHILS NFR BLD AUTO: 69.7 % (ref 42.7–76)
NRBC BLD AUTO-RTO: 0 /100 WBC (ref 0–0.2)
PLATELET # BLD AUTO: 193 10*3/MM3 (ref 140–450)
POC ALBUMIN, URINE: 10 MG/L
POC CREATININE, URINE: 100 MG/DL
POC URINE ALB/CREA RATIO: <30
RBC # BLD AUTO: 4.69 10*6/MM3 (ref 4.14–5.8)
WBC # BLD AUTO: 9.12 10*3/MM3 (ref 3.4–10.8)

## 2025-06-25 PROCEDURE — 82044 UR ALBUMIN SEMIQUANTITATIVE: CPT | Performed by: STUDENT IN AN ORGANIZED HEALTH CARE EDUCATION/TRAINING PROGRAM

## 2025-06-25 PROCEDURE — 82570 ASSAY OF URINE CREATININE: CPT | Performed by: STUDENT IN AN ORGANIZED HEALTH CARE EDUCATION/TRAINING PROGRAM

## 2025-06-25 PROCEDURE — 3044F HG A1C LEVEL LT 7.0%: CPT | Performed by: STUDENT IN AN ORGANIZED HEALTH CARE EDUCATION/TRAINING PROGRAM

## 2025-06-25 PROCEDURE — 1125F AMNT PAIN NOTED PAIN PRSNT: CPT | Performed by: STUDENT IN AN ORGANIZED HEALTH CARE EDUCATION/TRAINING PROGRAM

## 2025-06-25 PROCEDURE — 99214 OFFICE O/P EST MOD 30 MIN: CPT | Performed by: STUDENT IN AN ORGANIZED HEALTH CARE EDUCATION/TRAINING PROGRAM

## 2025-06-25 NOTE — ASSESSMENT & PLAN NOTE
Comminuted, minimally displaced fx of the proximal  right first distal phalange  Advised to wear walking boot  Was seen by podiatry yesterday  Increase meloxicam to 15mg daily for 2 weeks and resume 7.5mg thereafter

## 2025-06-25 NOTE — PROGRESS NOTES
Office Note     Name: Peter Ghotra    : 1955     MRN: [id number]     Chief Complaint  Toe Pain (ER visit 25)    Subjective     History of Present Illness:  Peter Ghotra is a 70 y.o. male who presents today for right toe fracture. A piece of glass from a cabinet fell on it. He went to the ER and was advised that XR was normal. Eventually seen at podiatry's office and advised he had   Mildly comminuted and minimally displaced fracture of the proximal  right first distal phalange  Advised walking boot and ffup in 6 weeks. He notes good pain control but does have difficulty with weight bearing on right foot.    Family History:   Family History   Problem Relation Age of Onset    Arthritis Father        Social History:   Social History     Socioeconomic History    Marital status: Single   Tobacco Use    Smoking status: Every Day     Average packs/day: 1.9 packs/day for 55.0 years (103.0 ttl pk-yrs)     Types: Cigarettes     Start date:     Smokeless tobacco: Never   Vaping Use    Vaping status: Never Used   Substance and Sexual Activity    Alcohol use: Yes     Alcohol/week: 2.0 standard drinks of alcohol     Types: 2 Cans of beer per week     Comment: 1 beer per day    Drug use: Never    Sexual activity: Not Currently     Partners: Female     Birth control/protection: None       Health Maintenance   Topic Date Due    Hepatitis B (1 of 3 - Risk 3-dose series) Never done    DIABETIC EYE EXAM  2024    COVID-19 Vaccine ( season) 10/14/2025 (Originally 2024)    ZOSTER VACCINE (1 of 2) 2026 (Originally 2005)    INFLUENZA VACCINE  2025    HEMOGLOBIN A1C  10/23/2025    LUNG CANCER SCREENING  2026    ANNUAL WELLNESS VISIT  2026    DIABETIC FOOT EXAM  2026    URINE MICROALBUMIN-CREATININE RATIO (uACR)  2026    TDAP/TD VACCINES (2 - Td or Tdap) 2027    COLORECTAL CANCER SCREENING  2033    HEPATITIS C SCREENING  Completed     "Pneumococcal Vaccine 50+  Completed    AAA SCREEN ONCE  Completed       Patient Care Team:  Arely Garrett MD as PCP - General (Family Medicine)  Aubrey Borrego MD as Consulting Physician (General Surgery)    Objective     Vital Signs  /68   Pulse 69   Temp 98.7 °F (37.1 °C) (Infrared)   Resp 20   Ht 162.6 cm (64.02\")   Wt 63.5 kg (140 lb)   SpO2 95%   BMI 24.02 kg/m²   Estimated body mass index is 24.02 kg/m² as calculated from the following:    Height as of this encounter: 162.6 cm (64.02\").    Weight as of this encounter: 63.5 kg (140 lb).        Physical Exam  Skin:     General: Skin is warm.   Neurological:      General: No focal deficit present.      Mental Status: He is oriented to person, place, and time. Mental status is at baseline.      Sensory: No sensory deficit.      Motor: No weakness.      Gait: Gait normal.          Procedures     Assessment and Plan     Diagnoses and all orders for this visit:    1. Unspecified fracture of right toe(s), subsequent encounter for fracture with routine healing (Primary)  Assessment & Plan:  Comminuted, minimally displaced fx of the proximal  right first distal phalange  Advised to wear walking boot  Was seen by podiatry yesterday  Increase meloxicam to 15mg daily for 2 weeks and resume 7.5mg thereafter      2. Type 2 diabetes mellitus with hyperglycemia, without long-term current use of insulin  Assessment & Plan:  Continue metformin for now  Obtain urine microalbumin today ratio <30 no need for ACEi/ARBs    Orders:  -     POC Albumin/Creatinine Ratio Urine    3. Elevated hemoglobin  Assessment & Plan:  Repeat cbc today    Orders:  -     CBC & Differential         Counseling was given to patient for the following topics: instructions for management, risks and benefits of treatment options, and importance of treatment compliance.    Follow Up  No follow-ups on file.    MD UGO Heller [facility] " PRIMARY CARE  [address] [address] [phone]

## 2025-06-26 ENCOUNTER — TELEPHONE (OUTPATIENT)
Dept: INTERNAL MEDICINE | Facility: CLINIC | Age: 70
End: 2025-06-26
Payer: MEDICARE

## 2025-07-17 ENCOUNTER — OFFICE VISIT (OUTPATIENT)
Dept: PSYCHIATRY | Facility: CLINIC | Age: 70
End: 2025-07-17
Payer: MEDICARE

## 2025-07-17 ENCOUNTER — LAB (OUTPATIENT)
Dept: LAB | Facility: HOSPITAL | Age: 70
End: 2025-07-17
Payer: MEDICARE

## 2025-07-17 VITALS
HEART RATE: 83 BPM | DIASTOLIC BLOOD PRESSURE: 64 MMHG | BODY MASS INDEX: 24.24 KG/M2 | HEIGHT: 64 IN | SYSTOLIC BLOOD PRESSURE: 122 MMHG | OXYGEN SATURATION: 95 % | WEIGHT: 142 LBS

## 2025-07-17 DIAGNOSIS — F10.90 UNCOMPLICATED ALCOHOL USE: Primary | ICD-10-CM

## 2025-07-17 DIAGNOSIS — Z65.3 INVOLVED IN LEGAL PROCEEDINGS: ICD-10-CM

## 2025-07-17 DIAGNOSIS — F10.20 ALCOHOL DEPENDENCE, UNCOMPLICATED: ICD-10-CM

## 2025-07-17 LAB
AMPHET+METHAMPHET UR QL: NEGATIVE
AMPHETAMINES UR QL: NEGATIVE
BARBITURATES UR QL SCN: NEGATIVE
BENZODIAZ UR QL SCN: NEGATIVE
BUPRENORPHINE SERPL-MCNC: NEGATIVE NG/ML
CANNABINOIDS SERPL QL: NEGATIVE
COCAINE UR QL: NEGATIVE
METHADONE UR QL SCN: NEGATIVE
OPIATES UR QL: NEGATIVE
OXYCODONE UR QL SCN: NEGATIVE
PCP UR QL SCN: NEGATIVE
TRICYCLICS UR QL SCN: NEGATIVE

## 2025-07-17 PROCEDURE — 80306 DRUG TEST PRSMV INSTRMNT: CPT | Performed by: NURSE PRACTITIONER
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
## 2025-07-19 LAB — REF LAB TEST METHOD: NORMAL

## 2025-07-20 LAB
6-ACETYLMORPHINE: NOT DETECTED NG/ML
9-DELTA-THC-COOH: NOT DETECTED NG/ML
ALCOHOL, ETHYL: NOT DETECTED MG/DL
AMPHETAMINE: NOT DETECTED NG/ML
BENZODIAZ BLD QL: NOT DETECTED NG/ML
BUPRENORPHINE SAL CFM-MCNC: NOT DETECTED NG/ML
BUPRENORPHINE: NOT DETECTED NG/ML
COCAINE METABOLITE: NOT DETECTED NG/ML
CREATININE: 135.2 MG/DL
EDDP SERPL QL: NOT DETECTED NG/ML
FENTANYL-EIA: NOT DETECTED NG/ML
GABAPENTIN: NOT DETECTED NG/ML
METHAMPHETAMINE: NOT DETECTED NG/ML
METHYLPHENIDATE: NOT DETECTED NG/ML
NORBUPRENORPHINE: NOT DETECTED NG/ML
O-DESMETHYLTRAMADOL: NOT DETECTED NG/ML
OPIATES: NOT DETECTED NG/ML
OXIDANTS: NOT DETECTED UG/ML
OXYCODONE: NOT DETECTED NG/ML
PCP: NOT DETECTED NG/ML
PH: 5.9 (ref 4.5–9)
PREGABALIN: NOT DETECTED NG/ML
SPECIFIC GRAVITY, QUAN: 1.02 (ref 1–1.03)
THC: NOT DETECTED NG/ML
TRAMADOL UR CFM-MCNC: NOT DETECTED NG/ML
ZOLPIDEM PHENYL-4-CARBOXYLIC ACID: NOT DETECTED NG/ML

## 2025-07-25 ENCOUNTER — HOSPITAL ENCOUNTER (OUTPATIENT)
Dept: CT IMAGING | Facility: HOSPITAL | Age: 70
Discharge: HOME | End: 2025-07-25
Payer: MEDICARE

## 2025-07-25 DIAGNOSIS — R91.1 LUNG NODULE SEEN ON IMAGING STUDY: Primary | ICD-10-CM

## 2025-07-25 DIAGNOSIS — F17.210 CIGARETTE NICOTINE DEPENDENCE WITHOUT COMPLICATION: ICD-10-CM

## 2025-07-25 DIAGNOSIS — R91.1 LUNG NODULE SEEN ON IMAGING STUDY: ICD-10-CM

## 2025-07-25 DIAGNOSIS — R91.1 LUNG NODULE: ICD-10-CM

## 2025-07-25 PROCEDURE — 71250 CT THORAX DX C-: CPT

## 2025-08-14 ENCOUNTER — OFFICE VISIT (OUTPATIENT)
Dept: PSYCHIATRY | Facility: CLINIC | Age: 70
End: 2025-08-14
Payer: MEDICARE

## 2025-08-14 DIAGNOSIS — Z65.3 INVOLVED IN LEGAL PROCEEDINGS: ICD-10-CM

## 2025-08-14 DIAGNOSIS — F10.90 UNCOMPLICATED ALCOHOL USE: Primary | ICD-10-CM

## (undated) DEVICE — EYE CARE POST OP KIT: Brand: MEDLINE INDUSTRIES, INC.

## (undated) DEVICE — DRN PENRS SIL 1/4X18IN LF STRL

## (undated) DEVICE — SUT VIC 4/0 SH 27IN J415H

## (undated) DEVICE — GLV SURG SENSICARE W/ALOE PF LF 7.5 STRL

## (undated) DEVICE — SINGLE USE MEDICAL DEVICE FOR OPHTHALMIC SURGERY: Brand: 23G ASP HANDPIECE ROUGH 12/BOX

## (undated) DEVICE — SUT VIC 3/0 TIES 18IN J110T

## (undated) DEVICE — ADHS LIQ MASTISOL 2/3ML

## (undated) DEVICE — TOWEL,OR,DSP,ST,BLUE,STD,4/PK,20PK/CS: Brand: MEDLINE

## (undated) DEVICE — SUT PROLENE CLOSURE MO6 2/0 30IN 8417H

## (undated) DEVICE — STRIP,CLOSURE,WOUND,MEDI-STRIP,1/2X4: Brand: MEDLINE

## (undated) DEVICE — UNDYED MONOFILAMENT (POLYDIOXANONE), ABSORBABLE SYNTHETIC SURGICAL SUTURE: Brand: PDS

## (undated) DEVICE — SUT VIC 3/0 SH 27IN J416H

## (undated) DEVICE — DRSNG WND GZ PAD BORDERED LF 4X5IN STRL

## (undated) DEVICE — QUICK CATCH IN-LINE SUCTION POLYP TRAP IS USED FOR SUCTION RETRIEVAL OF ENDOSCOPICALLY REMOVED POLYPS.

## (undated) DEVICE — VLV SXN AIR/H2O ORCAPOD3 1P/U STRL

## (undated) DEVICE — SINGLE-USE POLYPECTOMY SNARE: Brand: CAPTIVATOR II

## (undated) DEVICE — THE MONARCH® "D" CARTRIDGE IS A SINGLE-USE POLYPROPYLENE CARTRIDGE FOR POSTERIOR CHAMBER IOL DELIVERY: Brand: MONARCH® III

## (undated) DEVICE — GLV SURG BIOGEL M LTX PF 6 1/2

## (undated) DEVICE — NDL HYPO ECLPS SFTY 22G 1 1/2IN

## (undated) DEVICE — ENDOSCOPY PORT CONNECTOR FOR OLYMPUS® SCOPES: Brand: ERBE

## (undated) DEVICE — SOL IRRIG H2O 1000ML STRL

## (undated) DEVICE — SINGLE USE MEDICAL DEVICE FOR OPHTHALMIC SURGERY: Brand: 23G IRR HANDPIECE SMOOTH 12B

## (undated) DEVICE — Device

## (undated) DEVICE — ANTIBACTERIAL UNDYED BRAIDED (POLYGLACTIN 910), SYNTHETIC ABSORBABLE SUTURE: Brand: COATED VICRYL

## (undated) DEVICE — SUT VIC 2/0 SH 27IN

## (undated) DEVICE — HYBRID TUBING/CAP SET FOR OLYMPUS® SCOPES: Brand: ERBE

## (undated) DEVICE — SLV SCD CALF HEMOFORCE DVT THERP REPROC MD

## (undated) DEVICE — 0.8MM CLEARPORT PARA KNIFE: Brand: SHARPOINT

## (undated) DEVICE — CLEAR CORNEAL KNIFE 2.4MM ANG: Brand: SHARPOINT

## (undated) DEVICE — RICH MAJOR PROCEDURE: Brand: MEDLINE INDUSTRIES, INC.

## (undated) DEVICE — LUBE JELLY PK/2.75GM STRL BX/144

## (undated) DEVICE — CANN HYDRODISSECTION

## (undated) DEVICE — SYR LUERLOK 30CC

## (undated) DEVICE — DRP SUP TRIDENT FACE

## (undated) DEVICE — CANN IRR/INJ AIR ANT CHAMBER 6MM BEND 27G